# Patient Record
Sex: MALE | Race: OTHER | Employment: OTHER | URBAN - METROPOLITAN AREA
[De-identification: names, ages, dates, MRNs, and addresses within clinical notes are randomized per-mention and may not be internally consistent; named-entity substitution may affect disease eponyms.]

---

## 2024-01-31 ENCOUNTER — OFFICE VISIT (OUTPATIENT)
Dept: OTOLARYNGOLOGY | Facility: CLINIC | Age: 49
End: 2024-01-31
Payer: COMMERCIAL

## 2024-01-31 VITALS
BODY MASS INDEX: 28.35 KG/M2 | SYSTOLIC BLOOD PRESSURE: 122 MMHG | TEMPERATURE: 97.3 F | DIASTOLIC BLOOD PRESSURE: 86 MMHG | WEIGHT: 191.4 LBS | HEIGHT: 69 IN

## 2024-01-31 DIAGNOSIS — J33.0 POLYP OF BOTH NASAL CAVITIES: ICD-10-CM

## 2024-01-31 DIAGNOSIS — J33.9 MULTIPLE NASAL POLYPS: ICD-10-CM

## 2024-01-31 DIAGNOSIS — J32.2 CHRONIC ETHMOIDAL SINUSITIS: ICD-10-CM

## 2024-01-31 DIAGNOSIS — J34.3 HYPERTROPHY OF BOTH INFERIOR NASAL TURBINATES: ICD-10-CM

## 2024-01-31 DIAGNOSIS — J32.0 CHRONIC MAXILLARY SINUSITIS: Primary | ICD-10-CM

## 2024-01-31 PROCEDURE — 99204 OFFICE O/P NEW MOD 45 MIN: CPT | Performed by: OTOLARYNGOLOGY

## 2024-01-31 PROCEDURE — 31231 NASAL ENDOSCOPY DX: CPT | Performed by: OTOLARYNGOLOGY

## 2024-01-31 PROCEDURE — 1036F TOBACCO NON-USER: CPT | Performed by: OTOLARYNGOLOGY

## 2024-01-31 RX ORDER — ALBUTEROL SULFATE 90 UG/1
2 AEROSOL, METERED RESPIRATORY (INHALATION) EVERY 4 HOURS PRN
COMMUNITY
Start: 2019-12-07

## 2024-01-31 RX ORDER — FLECAINIDE ACETATE 50 MG/1
50 TABLET ORAL 2 TIMES DAILY
COMMUNITY
Start: 2023-09-21

## 2024-01-31 RX ORDER — FLUTICASONE PROPIONATE 50 MCG
2 SPRAY, SUSPENSION (ML) NASAL DAILY
Qty: 48 ML | Refills: 3 | Status: SHIPPED | OUTPATIENT
Start: 2024-01-31 | End: 2024-04-30

## 2024-01-31 RX ORDER — METOPROLOL SUCCINATE 50 MG/1
50 TABLET, EXTENDED RELEASE ORAL 2 TIMES DAILY
COMMUNITY

## 2024-02-01 DIAGNOSIS — J33.9 MULTIPLE NASAL POLYPS: Primary | ICD-10-CM

## 2024-02-01 RX ORDER — PREDNISONE 10 MG/1
TABLET ORAL
Qty: 51 TABLET | Refills: 0 | Status: SHIPPED | OUTPATIENT
Start: 2024-02-01 | End: 2024-02-29 | Stop reason: ALTCHOICE

## 2024-02-15 ENCOUNTER — HOSPITAL ENCOUNTER (OUTPATIENT)
Dept: RADIOLOGY | Facility: CLINIC | Age: 49
Discharge: HOME | End: 2024-02-15
Payer: COMMERCIAL

## 2024-02-15 DIAGNOSIS — J32.2 CHRONIC ETHMOIDAL SINUSITIS: ICD-10-CM

## 2024-02-15 DIAGNOSIS — J32.0 CHRONIC MAXILLARY SINUSITIS: ICD-10-CM

## 2024-02-15 DIAGNOSIS — J33.0 POLYP OF BOTH NASAL CAVITIES: ICD-10-CM

## 2024-02-15 PROCEDURE — 70486 CT MAXILLOFACIAL W/O DYE: CPT

## 2024-02-29 ENCOUNTER — OFFICE VISIT (OUTPATIENT)
Dept: OTOLARYNGOLOGY | Facility: CLINIC | Age: 49
End: 2024-02-29
Payer: COMMERCIAL

## 2024-02-29 VITALS
BODY MASS INDEX: 28.82 KG/M2 | DIASTOLIC BLOOD PRESSURE: 88 MMHG | WEIGHT: 194.6 LBS | TEMPERATURE: 97.4 F | HEIGHT: 69 IN | SYSTOLIC BLOOD PRESSURE: 125 MMHG

## 2024-02-29 DIAGNOSIS — J32.2 CHRONIC ETHMOIDAL SINUSITIS: ICD-10-CM

## 2024-02-29 DIAGNOSIS — J33.9 MULTIPLE NASAL POLYPS: ICD-10-CM

## 2024-02-29 DIAGNOSIS — J32.0 CHRONIC MAXILLARY SINUSITIS: Primary | ICD-10-CM

## 2024-02-29 DIAGNOSIS — J34.3 HYPERTROPHY OF BOTH INFERIOR NASAL TURBINATES: ICD-10-CM

## 2024-02-29 PROBLEM — I47.10 SVT (SUPRAVENTRICULAR TACHYCARDIA) (CMS-HCC): Status: ACTIVE | Noted: 2017-07-07

## 2024-02-29 PROCEDURE — 1036F TOBACCO NON-USER: CPT | Performed by: OTOLARYNGOLOGY

## 2024-02-29 PROCEDURE — 99214 OFFICE O/P EST MOD 30 MIN: CPT | Performed by: OTOLARYNGOLOGY

## 2024-02-29 NOTE — PROGRESS NOTES
Impression:  1. Chronic maxillary sinusitis        2. Chronic ethmoidal sinusitis        3. Multiple nasal polyps        4. Hypertrophy of both inferior nasal turbinates              RECOMMENDATIONS/PLAN :  The various risks and benefits, complications and alternatives and expected course of recovery were explained to the patient and he would like to proceed with endoscopic sinus surgery using the navigation system to open up his anterior and posterior ethmoid sinuses, bilateral maxillary sinuses, endoscopic removal of bilateral nasal polyps, bilateral inferior turbinate Coblation/resection.  We will obtain preadmission testing due to his previous history of supraventricular tachycardia and if anesthesia does not feel comfortable performing this procedure at an outpatient surgery center, I will have to refer him to one of my rhinology colleagues who has access to a hospital setting.      **This electronic medical record note was created with the use of voice recognition software.  Despite proofreading, typographical or grammatical errors may be present that could affect meaning of content **    Subjective   Patient ID:     Pepe Whatley is a 48 y.o. male who presents to the office today as a checkup on his sinuses.  He finished all of his oral prednisone and he is breathing a little better.  He has been using his Flonase nasal spray as directed.  No recent fever chills or pus draining from the sinuses.  No significant facial pain however he still has significant congestion.  I was able to review his sinus CT films with him today.    ROS:  A detailed 12 system review of systems is noted on the intake form has been reviewed with the patient with details noted in the HPI and scanned into the patient's medical record.    Objective     PMH: Chronic sinusitis, history of nasal polyps, history of supraventricular tachycardia, history of asthma    History reviewed. No pertinent surgical history.     No Known Allergies  "      Current Outpatient Medications:     flecainide (Tambocor) 50 mg tablet, Take 1 tablet (50 mg) by mouth twice a day., Disp: , Rfl:     fluticasone (Flonase) 50 mcg/actuation nasal spray, Administer 2 sprays into each nostril once daily., Disp: 48 mL, Rfl: 3    metoprolol succinate XL (Toprol-XL) 50 mg 24 hr tablet, Take 1 tablet (50 mg) by mouth 2 times a day., Disp: , Rfl:     albuterol 90 mcg/actuation inhaler, Inhale 2 puffs every 4 hours if needed., Disp: , Rfl:      Tobacco Use: Low Risk  (2/29/2024)    Patient History     Smoking Tobacco Use: Never     Smokeless Tobacco Use: Never     Passive Exposure: Not on file        Alcohol Use: Not on file        Social History     Substance and Sexual Activity   Drug Use Not on file        Physical Exam:  Visit Vitals  /88   Temp 36.3 °C (97.4 °F) (Temporal)   Ht 1.753 m (5' 9\")   Wt 88.3 kg (194 lb 9.6 oz)   BMI 28.74 kg/m²   Smoking Status Never   BSA 2.07 m²      General: Patient is alert, oriented, cooperative in no apparent distress.  Head: Normocephalic, atraumatic.  Eyes: PERRL, EOMI, Conjunctiva is clear. No nystagmus.  Ears: Right Ear-- Pinna is normal.  External auditory canal is patent. Tympanic membrane is [intact, translucent and has good mobility with my pneumatic otoscope. No effusion].  Mastoid is nontender.  Left ear-- Pinna is normal.  External auditory canal is patent. Tympanic membrane is [intact, translucent and has good mobility with my pneumatic otoscope.  No effusion].  Mastoid is nontender.  Nose: Septum is relatively straight with a mild deviation to the right.  No septal perforation or lesions. No septal hematoma/ seroma.  No signs of bleeding.  Inferior turbinates are moderately swollen and obstructive..  Patient has extensive bilateral nasal polyps no infectious drainage.  Throat:  Floor of mouth is clear, no masses.  Tongue appears normal, no lesions or masses. Gums, gingiva, buccal mucosa appear pink and moist, no lesions. Teeth " are in good repair.  No obvious dental infections.  Peritonsillar regions appear symmetric without swelling.  Hard and soft palate appear normal, no obvious cleft. Uvula is midline.  Oropharynx: No lesions. Retropharyngeal wall is flat.  No active postnasal drip.  Neck: Supple,  no lymphadenopathy.  No masses.  Salivary Glands: Symmetric bilaterally.  No palpable masses.  No evidence of acute infection or salivary stones  Neurologic: Cranial Nerves 2-12 are grossly intact without focal deficits. Cerebellar function testing is normal.   Cardiovascular: Heart regular rate and rhythm  Lungs: Clear to auscultation bilaterally  Abdomen: Soft nontender bowel sounds present x 4  Extremities: No clubbing cyanosis or edema  Results:   I personally reviewed his recent sinus CT results and he has extensive bilateral anterior and posterior ethmoid disease with bilateral nasal polyps.  He also has chronic thickening along his bilateral maxillary sinuses.  He has bilateral inferior turbinate hypertrophy.    Procedure:   []    Paul Ureña, DO

## 2024-03-25 PROBLEM — H52.209 ASTIGMATISM: Status: ACTIVE | Noted: 2024-03-25

## 2024-03-25 PROBLEM — R09.89 PULMONARY CONGESTION: Status: ACTIVE | Noted: 2024-03-25

## 2024-03-25 PROBLEM — J45.901 ACUTE ASTHMA EXACERBATION (HHS-HCC): Status: ACTIVE | Noted: 2023-02-12

## 2024-03-25 PROBLEM — R10.13 ABDOMINAL PAIN, EPIGASTRIC: Status: ACTIVE | Noted: 2018-02-26

## 2024-03-25 PROBLEM — J32.9 CHRONIC SINUSITIS: Status: ACTIVE | Noted: 2024-02-15

## 2024-03-25 PROBLEM — R42 DIZZINESS: Status: ACTIVE | Noted: 2024-03-25

## 2024-03-25 PROBLEM — J40 BRONCHITIS, NOT SPECIFIED AS ACUTE OR CHRONIC: Status: ACTIVE | Noted: 2023-02-12

## 2024-03-25 PROBLEM — J33.0: Status: ACTIVE | Noted: 2024-02-15

## 2024-03-25 PROBLEM — I49.9 CARDIAC ARRHYTHMIA: Status: ACTIVE | Noted: 2018-02-26

## 2024-03-25 PROBLEM — R00.2 PALPITATIONS: Status: ACTIVE | Noted: 2023-02-12

## 2024-03-25 PROBLEM — J32.9 SINOBRONCHITIS: Status: ACTIVE | Noted: 2024-03-25

## 2024-03-25 PROBLEM — R07.89 ATYPICAL CHEST PAIN: Status: ACTIVE | Noted: 2023-02-12

## 2024-03-25 PROBLEM — R19.7 DIARRHEA, UNSPECIFIED: Status: ACTIVE | Noted: 2024-03-25

## 2024-03-25 PROBLEM — J40 SINOBRONCHITIS: Status: ACTIVE | Noted: 2024-03-25

## 2024-03-25 PROBLEM — J34.3 HYPERTROPHY OF NASAL TURBINATES: Status: ACTIVE | Noted: 2024-03-25

## 2024-03-25 PROBLEM — R09.89 CHEST CONGESTION: Status: ACTIVE | Noted: 2024-03-25

## 2024-03-25 PROBLEM — R05.9 COUGH, UNSPECIFIED: Status: ACTIVE | Noted: 2023-02-12

## 2024-03-25 PROBLEM — J33.9 NASAL POLYPS: Status: ACTIVE | Noted: 2024-03-25

## 2024-03-25 RX ORDER — CALCIPOTRIENE 50 UG/G
OINTMENT TOPICAL
COMMUNITY
Start: 2023-11-03 | End: 2024-03-29 | Stop reason: ALTCHOICE

## 2024-03-25 RX ORDER — MELOXICAM 15 MG/1
1 TABLET ORAL DAILY
COMMUNITY
Start: 2023-11-02

## 2024-03-25 RX ORDER — HYDROGEN PEROXIDE 3 %
SOLUTION, NON-ORAL MISCELLANEOUS
COMMUNITY

## 2024-03-25 RX ORDER — CETIRIZINE HYDROCHLORIDE 10 MG/1
TABLET ORAL
COMMUNITY
Start: 2023-07-17

## 2024-03-28 ENCOUNTER — OFFICE VISIT (OUTPATIENT)
Dept: OTOLARYNGOLOGY | Facility: CLINIC | Age: 49
End: 2024-03-28
Payer: COMMERCIAL

## 2024-03-28 VITALS
TEMPERATURE: 98.1 F | HEIGHT: 66 IN | DIASTOLIC BLOOD PRESSURE: 81 MMHG | WEIGHT: 189 LBS | RESPIRATION RATE: 16 BRPM | BODY MASS INDEX: 30.37 KG/M2 | OXYGEN SATURATION: 97 % | HEART RATE: 70 BPM | SYSTOLIC BLOOD PRESSURE: 116 MMHG

## 2024-03-28 DIAGNOSIS — J34.89 NASAL OBSTRUCTION: ICD-10-CM

## 2024-03-28 DIAGNOSIS — J32.4 CHRONIC PANSINUSITIS: Primary | ICD-10-CM

## 2024-03-28 DIAGNOSIS — J34.3 HYPERTROPHY OF NASAL TURBINATES: ICD-10-CM

## 2024-03-28 DIAGNOSIS — J33.9 NOSE POLYP: ICD-10-CM

## 2024-03-28 DIAGNOSIS — J31.0 CHRONIC RHINITIS: ICD-10-CM

## 2024-03-28 DIAGNOSIS — J34.2 DEVIATED NASAL SEPTUM: ICD-10-CM

## 2024-03-28 PROCEDURE — 31231 NASAL ENDOSCOPY DX: CPT | Performed by: STUDENT IN AN ORGANIZED HEALTH CARE EDUCATION/TRAINING PROGRAM

## 2024-03-28 PROCEDURE — 99214 OFFICE O/P EST MOD 30 MIN: CPT | Performed by: STUDENT IN AN ORGANIZED HEALTH CARE EDUCATION/TRAINING PROGRAM

## 2024-03-28 RX ORDER — CEFAZOLIN SODIUM 2 G/100ML
2 INJECTION, SOLUTION INTRAVENOUS ONCE
OUTPATIENT
Start: 2024-03-28 | End: 2024-03-28

## 2024-03-28 RX ORDER — SODIUM CHLORIDE, SODIUM LACTATE, POTASSIUM CHLORIDE, CALCIUM CHLORIDE 600; 310; 30; 20 MG/100ML; MG/100ML; MG/100ML; MG/100ML
100 INJECTION, SOLUTION INTRAVENOUS CONTINUOUS
OUTPATIENT
Start: 2024-03-28

## 2024-03-28 SDOH — ECONOMIC STABILITY: FOOD INSECURITY: WITHIN THE PAST 12 MONTHS, THE FOOD YOU BOUGHT JUST DIDN'T LAST AND YOU DIDN'T HAVE MONEY TO GET MORE.: NEVER TRUE

## 2024-03-28 SDOH — ECONOMIC STABILITY: FOOD INSECURITY: WITHIN THE PAST 12 MONTHS, YOU WORRIED THAT YOUR FOOD WOULD RUN OUT BEFORE YOU GOT MONEY TO BUY MORE.: NEVER TRUE

## 2024-03-28 ASSESSMENT — ENCOUNTER SYMPTOMS
DEPRESSION: 0
LOSS OF SENSATION IN FEET: 0
OCCASIONAL FEELINGS OF UNSTEADINESS: 0

## 2024-03-28 ASSESSMENT — PATIENT HEALTH QUESTIONNAIRE - PHQ9
2. FEELING DOWN, DEPRESSED OR HOPELESS: NOT AT ALL
SUM OF ALL RESPONSES TO PHQ9 QUESTIONS 1 AND 2: 0
1. LITTLE INTEREST OR PLEASURE IN DOING THINGS: NOT AT ALL

## 2024-03-28 ASSESSMENT — COLUMBIA-SUICIDE SEVERITY RATING SCALE - C-SSRS
1. IN THE PAST MONTH, HAVE YOU WISHED YOU WERE DEAD OR WISHED YOU COULD GO TO SLEEP AND NOT WAKE UP?: NO
2. HAVE YOU ACTUALLY HAD ANY THOUGHTS OF KILLING YOURSELF?: NO
6. HAVE YOU EVER DONE ANYTHING, STARTED TO DO ANYTHING, OR PREPARED TO DO ANYTHING TO END YOUR LIFE?: NO

## 2024-03-28 ASSESSMENT — PAIN SCALES - GENERAL: PAINLEVEL: 0-NO PAIN

## 2024-03-28 NOTE — LETTER
"March 29, 2024     Paul Ureña DO  [address] [place] [address]    Patient: Pepe Whatley   YOB: 1975   Date of Visit: 3/28/2024       Dear Dr. Paul Ureña DO:    Thank you for referring Pepe Whatley to me for evaluation. Below are my notes for this consultation.  If you have questions, please do not hesitate to call me. I look forward to following your patient along with you.       Sincerely,     Mars Glass MD      CC: Milton Aaron DO  ______________________________________________________________________________________    SUBJECTIVE  Patient ID: Pepe Whatley is a 48 y.o. male who presents for New Patient Visit (Sinus issues /Blockage ).    Referred by my partner for surgery; needs hospital setting for surgery due to SVT.    The patient reports prior surgery for his sinuses some 12 years ago; performed in [facility]. He notes constant nasal congestion/obstruction. Sense of smell is \"okay.\" They deny nasal drainage, facial pressure, facial pain, and epistaxis. They deny a history of environmental allergies; no formal allergy testing.    Previously tried steroid spray (Flonase) and oral steroids .     Review of Systems  Complete ROS negative except as noted above or on patient intake form and as above.    OBJECTIVE  Physical Exam  CONSTITUTIONAL: Well appearing male who appears stated age.  PSYCHIATRIC: Alert, appropriate mood and affect.  RESPIRATORY: Normal inspiration and expiration and chest wall expansion; no use of accessory muscles to breathe.  VOICE: Clear speech without hoarseness. No stridor nor stertor.  HEAD, FACE, AND SKIN: Symmetric facial feature. No cutaneous masses or lesions were visualized. The parotid and submandibular glands were normal to palpation.  EYES: Pupils were equal in size and reactive to light. Extra-ocular muscle function was intact. No nystagmus was observed. Vision was grossly intact.  EARS: External ears were normally formed with " no lesions. The external auditory canals were clear. The tympanic membranes were intact and in the neutral position. No significant retraction pockets nor effusions were appreciated.  NOSE: Nasal dorsum was midline. Anterior rhinoscopy demonstrated a septum deviation. Inferior turbinates were severely hypertrophied. Nasal polyposis noted at the middle meatus bilaterally.  ORAL CAVITY: Lips were without lesions. Moist mucous membranes. No lesions appreciated along the gingiva, oral mucosa, nor tongue.  DENTITION: Grossly normal without obvious infection nor inflammation.  OROPHARYNX: No lesion nor mucosal abnormality. The uvula was normal appearing. No drainage.  NECK: Visualization and palpation of the neck revealed no mass lesions, no thyromegaly or thyroid masses. No cutaneous lesions appreciated.  LYMPHATICS (CERVICAL): There were no palpable lymph nodes in the posterior triangle, submandibular triangle, jugulodigastric region, nor central neck.  NEUROLOGIC: Cranial nerves III, IV, and VI were noted to be intact via extra-ocular muscle movement testing. Cranial nerve V was noted to be intact to soft touch bilaterally. Cranial nerve VII was noted to be intact and symmetric by facial movement. Cranial nerve VIII was tested with normal voice examination and revealed grossly normal hearing. Cranial nerves IX and X noted to be intact by palatal movement. Cranial nerve XI noted to be intact via shoulder shrug.  Cranial nerve XII noted to be intact with active and symmetric tongue movement.     Radiology  CT sinus 2/15/2024: I personally reviewed with the patient his recent imaging.  This demonstrates varying levels of pansinusitis with thick mucosal thickening at the middle meatus and ostiomeatal complex consistent with no nasal polyposis.  Patient's imaging suggest likely prior septoplasty although there is some residual right superior septal deviation.  Inferior turbinates are quite  hypertrophied.      --------------------------------------------------  Procedure: Nasal endoscopy - Diagnostic  Indication: Chronic rhinitis, CRS, nasal polyposis  Informed consent verbally obtained: The risks, benefits, alternatives, and expectations were discussed with the patient, who wished to proceed  Anesthesia: declined (fasting)    Findings: After topical anesthetic was applied the nasal cavities were examined with a flexible endoscope. The septum was slightly deviated to the right superiorly.  - Right: The inferior turbinate was moderately hypertrophied. Thick nasal polyps could be appreciated tracking along the middle meatus to the sphenoethmoid recess.  The nasal passageway was narrowed secondary to edema.  - Left: The inferior turbinate was severely hypertrophied. Thick nasal polyps could be appreciated tracking along the middle meatus to the sphenoethmoid recess.  The nasal passageway was narrowed secondary to edema.    The nasopharynx was poorly visualized but appeared clear.    There were no complications and the patient tolerated the procedure well.  --------------------------------------------------    ASSESSMENT/PLAN  Diagnoses and all orders for this visit:  Chronic pansinusitis  -     Case Request Operating Room: Endoscopic sinus surgery with image guidance, inferior turbinate reductions, possible septoplasty; Standing  -     Request for Pre-Admission Testing Visit; Future  -     Place in outpatient/hospital ambulatory surgery; Standing  -     Full code; Standing  -     Coagulation Screen; Future  -     CBC; Future  -     Basic Metabolic Panel; Future  -     Vital Signs; Standing  -     Pulse oximetry, spot; Standing  -     Insert and maintain peripheral IV; Standing  -     Saline lock IV; Standing  -     lactated Ringer's infusion  -     ceFAZolin in dextrose (iso-os) (Ancef) IVPB 2 g  Nose polyp  -     Case Request Operating Room: Endoscopic sinus surgery with image guidance, inferior  turbinate reductions, possible septoplasty; Standing  -     Request for Pre-Admission Testing Visit; Future  -     Place in outpatient/hospital ambulatory surgery; Standing  -     Full code; Standing  -     Coagulation Screen; Future  -     CBC; Future  -     Basic Metabolic Panel; Future  -     Vital Signs; Standing  -     Pulse oximetry, spot; Standing  -     Insert and maintain peripheral IV; Standing  -     Saline lock IV; Standing  -     lactated Ringer's infusion  -     ceFAZolin in dextrose (iso-os) (Ancef) IVPB 2 g  Nasal obstruction  -     Case Request Operating Room: Endoscopic sinus surgery with image guidance, inferior turbinate reductions, possible septoplasty; Standing  Deviated nasal septum  -     Case Request Operating Room: Endoscopic sinus surgery with image guidance, inferior turbinate reductions, possible septoplasty; Standing  Hypertrophy of nasal turbinates  -     Case Request Operating Room: Endoscopic sinus surgery with image guidance, inferior turbinate reductions, possible septoplasty; Standing  Chronic rhinitis      48 y.o. male presenting with longstanding history of chronic sinusitis with nasal polyposis.    1.  Chronic sinusitis, nasal polyposis, nasal obstruction, NSD/ITH  The patient has longstanding history of chronic sinusitis.  He reports prior surgery some decade ago.  On nasal endoscopy the patient has fidelia nasal polyps obstructing the middle meatus bilaterally.  Imaging demonstrates varying levels of pansinusitis.  The patient was referred for consideration of surgery in the hospital setting.    The patient and I discussed their symptoms and clinical findings noted above in detail. We discussed options including observation, continued medical therapy, or consideration of surgical intervention. Endoscopic sinus surgery itself was discussed at length. The risks, benefits, complications, and alternatives were explained in detail including but not limited to persistence of  symptoms, anesthesia, pain, changes in or loss of smell, nasal obstruction, septal perforation, bleeding, infection, need for nasal packing, double vision, vision loss, CSF leak requiring other procedures to repair, numbness of teeth/and or face, need for revision surgery, injury to surrounding tissues, and unexpected risks.    The patient expressed understanding of these risks and wishes to proceed. Informed consent was signed. An information sheet via the medical record was provided to the patient which included the rationale for surgery, risks, and expected postoperative care.    Planned procedure: bilateral pansinus endoscopic sinus surgery with image guidance, inferior turbinate reductions, possible revision septoplasty    We will obtain cardiac clearance from his cardiologist.    This note was created using speech recognition transcription software. Despite proofreading, typographical errors may be present that affect the meaning of the content. Please contact my office with any questions.

## 2024-03-28 NOTE — PATIENT INSTRUCTIONS
Congratulations on having surgery! The following instructions will help you know what to expect in the days following your surgery. Do not hesitate to call if you have questions or concerns.    After surgery, please have the following items available at home:  - NeilMed Sinus Rinse® bottle for post-surgical nasal irrigations  - Oxymetazoline nasal spray (Afrin®) - to be used ONLY if you have a nosebleed  - 4x4 gauze and paper tape    Activities  - You may shower this evening.  - Avoid sneezing or blowing your nose for 2 weeks after surgery (gently sniff rather than blow. If you do sneeze, do so with your mouth open).  - Activities should be limited for 1 week after surgery.  - Do not lift heavy objects (greater than 10 pounds) for 1 week after surgery.  - Walking is strongly encouraged.  - Most patients do not return to work for about 5 to 7 days after surgery; however some return sooner, and others may need more time. The level of pain and frustration caused by the nasal congestion and blockage may be different from patient to patient.    Diet  Stick to lighter food for the 24 hours after surgery. Then you can resume your regular diet.    Pain Control  - Many patients do not have much pain after nasal surgery, but everyone is different.  - Most patients feel pressure and congestion.  - For mild pain, acetaminophen (Tylenol®) should work well.  - For stronger pain you may be prescribed a narcotic medication. Drink plenty of water as these stronger pain medications can be constipating. Also take the prescribed stool softeners on a regular basis until your bowel movements have returned to normal.  - Avoid taking aspirin, ibuprofen (Advil®, Motrin®), naproxen (Aleve®, Naprosyn®), and other nonsteroidal anti-inflammatory medications (NSAIDs) for a week following nasal surgery. These drugs can increase bleeding.  - Pain should lessen with time. If pain increases, call the office.    Drainage  You can expect to have  bloody nasal drainage after nasal or sinus surgery. To catch this drainage and to avoid continuous nose wiping we recommend using a gauze “mustache” dressing under the nose. Tape this to the cheeks for as long as the drainage continues.    NASAL SALINE IRRIGATION: THIS IS VERY IMPORTANT  - After sinus surgery, we like to have the nose irrigated with a NeilMed Sinus Rinse® bottle and a special saline solution (irrigation instructions and saline solution recipe are below). This will help rinse the blood clots and mucous from the nose.  - START IRRIGATIONS THE DAY AFTER SURGERY.  - Never use your fingers to clear blockages in your nose.    How to Irrigate:  - Fill the NeilMed Sinus Rinse® bottle with the room temperature saline solution (see below for recipe). Gently insert the tip into one nostril and squeeze. Keep your head down to prevent water from going back down into your throat. Use about one half of the bottle per nostril. Do this for each nostril 3-4 times daily for the first two weeks after surgery.    Nasal Irrigation Solution Recipe:  8 ounces of room temperature distilled water. (If you use tap water, boil it first and let it cool to room temperature.)  ½ teaspoon of table salt  ½ teaspoon of baking soda  You can use the NeilMed Sinus Rinse® solution packets if preferred.    BLEEDING  Some blood in your nasal drainage after surgery is expected. This may be dark red or brown. If the nose is bleeding freely or you think the amount is too much, call the office immediately.  Avoid nose blowing and try to sneeze with your mouth open if needed.  Sleeping with your head elevated will also help prevent bleeding and reduce congestion.  If you have a nosebleed, you can try spraying oxymetazoline spray (Afrin®) in the nose.    ANTIBIOTICS  You may be given antibiotics, which you should begin taking the day after surgery. If you were not prescribed antibiotics disregard this info.    STEROIDS  You may be given  steroids, which you should begin taking the day after surgery. If you were not prescribed steroids disregard this info.    Miscellaneous  A low-grade fever, less than 101° F is not uncommon for 2 to 3 days after surgery. If fever continues or is higher than 101° F, call your physician. Use acetaminophen to control the fever.    Follow-up  Your appointment for follow up after your surgery should have been scheduled at the time of your surgery. If not, please call the office for an appointment scheduled for 1-2 weeks after the date of your surgery.    Call the office or GO TO THE EMERGENCY ROOM if you have:  - Excessive pain, swelling, bleeding or drainage  - Shortness of breath or difficulty breathing  - Persistent nausea and vomiting  - Fever that continues or is higher than 101° F  - Neck stiffness (cannot touch your chin to your chest)  - Confusion  - Worsening headaches  - Clear drainage dripping from your nose like a leaky faucet (note that this may happen and is normal up to 30 minutes following saline sprays)  - Salty or metallic taste (note that you may experience a salty taste which is normal up to 30 minutes following saline sprays and irrigations)    Do not hesitate to call if you have any questions or concerns:  - Please call Dr. ELOINA Glass’s office at [phone] during normal business hours.  - After hours the office number will direct you to an answering service that will contact Dr. Glass.  - If Dr. Glass is not available for emergency questions you can call [facility] at [phone] and request to speak to the “ENT resident physician on call.”

## 2024-03-28 NOTE — PROGRESS NOTES
"SUBJECTIVE  Patient ID: Pepe Whatley is a 48 y.o. male who presents for New Patient Visit (Sinus issues /Blockage ).    Referred by my partner for surgery; needs hospital setting for surgery due to SVT.    The patient reports prior surgery for his sinuses some 12 years ago; performed in [facility]. He notes constant nasal congestion/obstruction. Sense of smell is \"okay.\" They deny nasal drainage, facial pressure, facial pain, and epistaxis. They deny a history of environmental allergies; no formal allergy testing.    Previously tried steroid spray (Flonase) and oral steroids .     Review of Systems  Complete ROS negative except as noted above or on patient intake form and as above.    OBJECTIVE  Physical Exam  CONSTITUTIONAL: Well appearing male who appears stated age.  PSYCHIATRIC: Alert, appropriate mood and affect.  RESPIRATORY: Normal inspiration and expiration and chest wall expansion; no use of accessory muscles to breathe.  VOICE: Clear speech without hoarseness. No stridor nor stertor.  HEAD, FACE, AND SKIN: Symmetric facial feature. No cutaneous masses or lesions were visualized. The parotid and submandibular glands were normal to palpation.  EYES: Pupils were equal in size and reactive to light. Extra-ocular muscle function was intact. No nystagmus was observed. Vision was grossly intact.  EARS: External ears were normally formed with no lesions. The external auditory canals were clear. The tympanic membranes were intact and in the neutral position. No significant retraction pockets nor effusions were appreciated.  NOSE: Nasal dorsum was midline. Anterior rhinoscopy demonstrated a septum deviation. Inferior turbinates were severely hypertrophied. Nasal polyposis noted at the middle meatus bilaterally.  ORAL CAVITY: Lips were without lesions. Moist mucous membranes. No lesions appreciated along the gingiva, oral mucosa, nor tongue.  DENTITION: Grossly normal without obvious infection nor " inflammation.  OROPHARYNX: No lesion nor mucosal abnormality. The uvula was normal appearing. No drainage.  NECK: Visualization and palpation of the neck revealed no mass lesions, no thyromegaly or thyroid masses. No cutaneous lesions appreciated.  LYMPHATICS (CERVICAL): There were no palpable lymph nodes in the posterior triangle, submandibular triangle, jugulodigastric region, nor central neck.  NEUROLOGIC: Cranial nerves III, IV, and VI were noted to be intact via extra-ocular muscle movement testing. Cranial nerve V was noted to be intact to soft touch bilaterally. Cranial nerve VII was noted to be intact and symmetric by facial movement. Cranial nerve VIII was tested with normal voice examination and revealed grossly normal hearing. Cranial nerves IX and X noted to be intact by palatal movement. Cranial nerve XI noted to be intact via shoulder shrug.  Cranial nerve XII noted to be intact with active and symmetric tongue movement.     Radiology  CT sinus 2/15/2024: I personally reviewed with the patient his recent imaging.  This demonstrates varying levels of pansinusitis with thick mucosal thickening at the middle meatus and ostiomeatal complex consistent with no nasal polyposis.  Patient's imaging suggest likely prior septoplasty although there is some residual right superior septal deviation.  Inferior turbinates are quite hypertrophied.      --------------------------------------------------  Procedure: Nasal endoscopy - Diagnostic  Indication: Chronic rhinitis, CRS, nasal polyposis  Informed consent verbally obtained: The risks, benefits, alternatives, and expectations were discussed with the patient, who wished to proceed  Anesthesia: declined (fasting)    Findings: After topical anesthetic was applied the nasal cavities were examined with a flexible endoscope. The septum was slightly deviated to the right superiorly.  - Right: The inferior turbinate was moderately hypertrophied. Thick nasal polyps could  be appreciated tracking along the middle meatus to the sphenoethmoid recess.  The nasal passageway was narrowed secondary to edema.  - Left: The inferior turbinate was severely hypertrophied. Thick nasal polyps could be appreciated tracking along the middle meatus to the sphenoethmoid recess.  The nasal passageway was narrowed secondary to edema.    The nasopharynx was poorly visualized but appeared clear.    There were no complications and the patient tolerated the procedure well.  --------------------------------------------------    ASSESSMENT/PLAN  Diagnoses and all orders for this visit:  Chronic pansinusitis  -     Case Request Operating Room: Endoscopic sinus surgery with image guidance, inferior turbinate reductions, possible septoplasty; Standing  -     Request for Pre-Admission Testing Visit; Future  -     Place in outpatient/hospital ambulatory surgery; Standing  -     Full code; Standing  -     Coagulation Screen; Future  -     CBC; Future  -     Basic Metabolic Panel; Future  -     Vital Signs; Standing  -     Pulse oximetry, spot; Standing  -     Insert and maintain peripheral IV; Standing  -     Saline lock IV; Standing  -     lactated Ringer's infusion  -     ceFAZolin in dextrose (iso-os) (Ancef) IVPB 2 g  Nose polyp  -     Case Request Operating Room: Endoscopic sinus surgery with image guidance, inferior turbinate reductions, possible septoplasty; Standing  -     Request for Pre-Admission Testing Visit; Future  -     Place in outpatient/hospital ambulatory surgery; Standing  -     Full code; Standing  -     Coagulation Screen; Future  -     CBC; Future  -     Basic Metabolic Panel; Future  -     Vital Signs; Standing  -     Pulse oximetry, spot; Standing  -     Insert and maintain peripheral IV; Standing  -     Saline lock IV; Standing  -     lactated Ringer's infusion  -     ceFAZolin in dextrose (iso-os) (Ancef) IVPB 2 g  Nasal obstruction  -     Case Request Operating Room: Endoscopic sinus  surgery with image guidance, inferior turbinate reductions, possible septoplasty; Standing  Deviated nasal septum  -     Case Request Operating Room: Endoscopic sinus surgery with image guidance, inferior turbinate reductions, possible septoplasty; Standing  Hypertrophy of nasal turbinates  -     Case Request Operating Room: Endoscopic sinus surgery with image guidance, inferior turbinate reductions, possible septoplasty; Standing  Chronic rhinitis      48 y.o. male presenting with longstanding history of chronic sinusitis with nasal polyposis.    1.  Chronic sinusitis, nasal polyposis, nasal obstruction, NSD/ITH  The patient has longstanding history of chronic sinusitis.  He reports prior surgery some decade ago.  On nasal endoscopy the patient has fidelia nasal polyps obstructing the middle meatus bilaterally.  Imaging demonstrates varying levels of pansinusitis.  The patient was referred for consideration of surgery in the hospital setting.    The patient and I discussed their symptoms and clinical findings noted above in detail. We discussed options including observation, continued medical therapy, or consideration of surgical intervention. Endoscopic sinus surgery itself was discussed at length. The risks, benefits, complications, and alternatives were explained in detail including but not limited to persistence of symptoms, anesthesia, pain, changes in or loss of smell, nasal obstruction, septal perforation, bleeding, infection, need for nasal packing, double vision, vision loss, CSF leak requiring other procedures to repair, numbness of teeth/and or face, need for revision surgery, injury to surrounding tissues, and unexpected risks.    The patient expressed understanding of these risks and wishes to proceed. Informed consent was signed. An information sheet via the medical record was provided to the patient which included the rationale for surgery, risks, and expected postoperative care.    Planned procedure:  bilateral pansinus endoscopic sinus surgery with image guidance, inferior turbinate reductions, possible revision septoplasty    We will obtain cardiac clearance from his cardiologist.    This note was created using speech recognition transcription software. Despite proofreading, typographical errors may be present that affect the meaning of the content. Please contact my office with any questions.

## 2024-03-29 PROBLEM — J31.0 CHRONIC RHINITIS: Status: ACTIVE | Noted: 2024-03-29

## 2024-03-29 PROBLEM — J34.89 NASAL OBSTRUCTION: Status: ACTIVE | Noted: 2024-03-29

## 2024-04-08 PROBLEM — J33.9 NOSE POLYP: Status: ACTIVE | Noted: 2024-03-28

## 2024-04-08 PROBLEM — J34.2 DEVIATED NASAL SEPTUM: Status: ACTIVE | Noted: 2024-03-28

## 2024-06-14 ENCOUNTER — PRE-ADMISSION TESTING (OUTPATIENT)
Dept: PREADMISSION TESTING | Facility: HOSPITAL | Age: 49
End: 2024-06-14
Payer: COMMERCIAL

## 2024-06-14 VITALS
HEART RATE: 67 BPM | TEMPERATURE: 96.8 F | SYSTOLIC BLOOD PRESSURE: 116 MMHG | DIASTOLIC BLOOD PRESSURE: 79 MMHG | RESPIRATION RATE: 20 BRPM | OXYGEN SATURATION: 97 %

## 2024-06-14 DIAGNOSIS — J32.4 CHRONIC PANSINUSITIS: ICD-10-CM

## 2024-06-14 DIAGNOSIS — Z01.818 PREOP TESTING: Primary | ICD-10-CM

## 2024-06-14 DIAGNOSIS — J33.9 NOSE POLYP: ICD-10-CM

## 2024-06-14 LAB
ANION GAP SERPL CALC-SCNC: 9 MMOL/L (ref 10–20)
APTT PPP: 33 SECONDS (ref 27–38)
BUN SERPL-MCNC: 21 MG/DL (ref 6–23)
CALCIUM SERPL-MCNC: 9.6 MG/DL (ref 8.6–10.3)
CHLORIDE SERPL-SCNC: 106 MMOL/L (ref 98–107)
CO2 SERPL-SCNC: 29 MMOL/L (ref 21–32)
CREAT SERPL-MCNC: 0.93 MG/DL (ref 0.5–1.3)
EGFRCR SERPLBLD CKD-EPI 2021: >90 ML/MIN/1.73M*2
ERYTHROCYTE [DISTWIDTH] IN BLOOD BY AUTOMATED COUNT: 12.5 % (ref 11.5–14.5)
GLUCOSE SERPL-MCNC: 97 MG/DL (ref 74–99)
HCT VFR BLD AUTO: 47.7 % (ref 41–52)
HGB BLD-MCNC: 16.3 G/DL (ref 13.5–17.5)
INR PPP: 1 (ref 0.9–1.1)
MCH RBC QN AUTO: 30.1 PG (ref 26–34)
MCHC RBC AUTO-ENTMCNC: 34.2 G/DL (ref 32–36)
MCV RBC AUTO: 88 FL (ref 80–100)
NRBC BLD-RTO: 0 /100 WBCS (ref 0–0)
PLATELET # BLD AUTO: 280 X10*3/UL (ref 150–450)
POTASSIUM SERPL-SCNC: 4.3 MMOL/L (ref 3.5–5.3)
PROTHROMBIN TIME: 11.1 SECONDS (ref 9.8–12.8)
RBC # BLD AUTO: 5.42 X10*6/UL (ref 4.5–5.9)
SODIUM SERPL-SCNC: 140 MMOL/L (ref 136–145)
WBC # BLD AUTO: 5.7 X10*3/UL (ref 4.4–11.3)

## 2024-06-14 PROCEDURE — 99203 OFFICE O/P NEW LOW 30 MIN: CPT | Performed by: NURSE PRACTITIONER

## 2024-06-14 PROCEDURE — 85027 COMPLETE CBC AUTOMATED: CPT

## 2024-06-14 PROCEDURE — 80048 BASIC METABOLIC PNL TOTAL CA: CPT

## 2024-06-14 PROCEDURE — 93005 ELECTROCARDIOGRAM TRACING: CPT

## 2024-06-14 PROCEDURE — 36415 COLL VENOUS BLD VENIPUNCTURE: CPT

## 2024-06-14 PROCEDURE — 85610 PROTHROMBIN TIME: CPT

## 2024-06-14 ASSESSMENT — DUKE ACTIVITY SCORE INDEX (DASI)
CAN YOU DO YARD WORK LIKE RAKING LEAVES, WEEDING OR PUSHING A MOWER: YES
CAN YOU PARTICIPATE IN STRENOUS SPORTS LIKE SWIMMING, SINGLES TENNIS, FOOTBALL, BASKETBALL, OR SKIING: NO
CAN YOU DO HEAVY WORK AROUND THE HOUSE LIKE SCRUBBING FLOORS OR LIFTING AND MOVING HEAVY FURNITURE: YES
CAN YOU WALK A BLOCK OR TWO ON LEVEL GROUND: YES
DASI METS SCORE: 9
CAN YOU RUN A SHORT DISTANCE: YES
CAN YOU PARTICIPATE IN MODERATE RECREATIONAL ACTIVITIES LIKE GOLF, BOWLING, DANCING, DOUBLES TENNIS OR THROWING A BASEBALL OR FOOTBALL: YES
CAN YOU TAKE CARE OF YOURSELF (EAT, DRESS, BATHE, OR USE TOILET): YES
CAN YOU WALK INDOORS, SUCH AS AROUND YOUR HOUSE: YES
CAN YOU HAVE SEXUAL RELATIONS: YES
TOTAL_SCORE: 50.7
CAN YOU CLIMB A FLIGHT OF STAIRS OR WALK UP A HILL: YES
CAN YOU DO LIGHT WORK AROUND THE HOUSE LIKE DUSTING OR WASHING DISHES: YES
CAN YOU DO MODERATE WORK AROUND THE HOUSE LIKE VACUUMING, SWEEPING FLOORS OR CARRYING GROCERIES: YES

## 2024-06-14 ASSESSMENT — PAIN SCALES - GENERAL: PAINLEVEL_OUTOF10: 0 - NO PAIN

## 2024-06-14 ASSESSMENT — PAIN - FUNCTIONAL ASSESSMENT: PAIN_FUNCTIONAL_ASSESSMENT: 0-10

## 2024-06-14 NOTE — H&P (VIEW-ONLY)
"CPM/PAT Evaluation       Name: Pepe Whatley (Pepe Whatley)  /Age: 1975/48 y.o.     In-Person       Chief Complaint:     48 yr old male with c/o sinus issues    He denies allergies or nasal injuries   He endorses sinus issues since his 20's---> had nasal surgery in [facility], with improvement  States has been getting a cold---\"that goes to chest and treated by PCP or UC\"    He c/o nasal congestion bilaterally that fluctuates from right to left  He c/o PND with rare nasal drainage.   Endorses h/o sinus infection, with most recent incident last month.  States sense of smell sometimes ok    Denies difficulties with taste, swallowing or hearing.    He has tried nasal sprays, humidier, OTC medications.    Denies any cardiac or respiratory symptoms.  Denies any past issues with anesthesia.    ENDOSCOPIC SINUS SURGERY WITH IMAGE GUIDANCE/ INFERIOR TURBINATE REDUCTIONS/ POSSIBLE SEPTOPLASTY is Scheduled on 2024  with Dr. Glass     Vitals:    24 0745   BP: 116/79   Pulse: 67   Resp: 20   Temp: 36 °C (96.8 °F)   SpO2: 97%          Past Medical History:   Diagnosis Date    Arrhythmia     Arthritis     Asthma (HHS-HCC)     Coronary artery disease     History of recent steroid use     Irregular heart beat        Past Surgical History:   Procedure Laterality Date    CARDIAC ELECTROPHYSIOLOGY MAPPING AND ABLATION         Patient  has no history on file for sexual activity.    Family History   Problem Relation Name Age of Onset    Diabetes Mother      Other (HLD) Mother      Coronary artery disease Father         No Known Allergies    Prior to Admission medications    Medication Sig Start Date End Date Taking? Authorizing Provider   albuterol 90 mcg/actuation inhaler Inhale 2 puffs every 4 hours if needed. 19   Historical Provider, MD   cetirizine (ZyrTEC) 10 mg tablet TAKE 1 TABLET BY MOUTH EVERY DAY FOR 7 DAYS 23   Historical Provider, MD   esomeprazole (NexIUM) 20 mg DR capsule Take by mouth.    " Historical Provider, MD   flecainide (Tambocor) 50 mg tablet Take 1 tablet (50 mg) by mouth twice a day. 9/21/23   Historical Provider, MD   fluticasone (Flonase) 50 mcg/actuation nasal spray Administer 2 sprays into each nostril once daily. 1/31/24 4/30/24  Paul Ureña DO   meloxicam (Mobic) 15 mg tablet Take 1 tablet (15 mg) by mouth once daily. 11/2/23   Historical Provider, MD   metoprolol succinate XL (Toprol-XL) 50 mg 24 hr tablet Take 1 tablet (50 mg) by mouth 2 times a day.    Historical Provider, MD          Review of Systems  Constitutional: NO F, chills, or sweats  Eyes: glasses, no blurred vision or visual disturbance  ENT: see HPI  Cardiovascular: if skips medication or strenuous physicial--+palpitations, with dizziness and SOB, no chest pain, no edema, and no syncope.   Respiratory: ANDRE, no cough,no s.o.b. and no wheezing  Gastrointestinal: no abdominal pain, no N/V, no blood in stools  Genitourinary: no dysuria, no urinary frequency, no urinary hesitancy and no feelings of urinary urgency.   Musculoskeletal: no arthralgias, no back pain and no myalgias.   Integumentary: no new skin lesions and no rashes.   Neurological: no difficulty walking, no headache, no limb weakness, no numbness and no tingling.   Endocrine: no recent weight gain and no recent weight loss.   Hematologic/Lymphatic: no tendency for easy bruising and no swollen glands.      Physical Exam  Constitutional:       Appearance: Normal appearance.   Cardiovascular:      Rate and Rhythm: Normal rate.      Heart sounds: Normal heart sounds.   Pulmonary:      Effort: Pulmonary effort is normal.      Breath sounds: Normal breath sounds.   Abdominal:      General: Bowel sounds are normal.      Palpations: Abdomen is soft.   Musculoskeletal:         General: Normal range of motion.      Cervical back: Normal range of motion.      Right lower leg: No edema.      Left lower leg: No edema.   Skin:     General: Skin is warm and dry.    Neurological:      Mental Status: He is alert and oriented to person, place, and time.          PAT AIRWAY:   Airway:     Mallampati::  II    TM distance::  <3 FB    Neck ROM::  Full  normal        Visit Vitals  /79   Pulse 67   Temp 36 °C (96.8 °F) (Temporal)   Resp 20       DASI Risk Score      Flowsheet Row Most Recent Value   DASI SCORE 50.7   METS Score (Will be calculated only when all the questions are answered) 9          Caprini DVT Assessment    No data to display       Modified Frailty Index    No data to display       CHADS2 Stroke Risk         N/A 3 to 100%: High Risk   2 to < 3%: Medium Risk   0 to < 2%: Low Risk     Last Change: N/A          This score determines the patient's risk of having a stroke if the patient has atrial fibrillation.        This score is not applicable to this patient. Components are not calculated.          Revised Cardiac Risk Index    No data to display       Apfel Simplified Score    No data to display       Risk Analysis Index Results This Encounter    No data found in the last 1 encounters.       Stop Bang Score      Flowsheet Row Most Recent Value   Do you snore loudly? 1   Do you often feel tired or fatigued after your sleep? 0   Has anyone ever observed you stop breathing in your sleep? 0   Do you have or are you being treated for high blood pressure? 1   Recent BMI (Calculated) 30.5   Is BMI greater than 35 kg/m2? 0=No   Age older than 50 years old? 0=No   Is your neck circumference greater than 17 inches (Male) or 16 inches (Female)? 0   Gender - Male 1=Yes   STOP-BANG Total Score 3            ASSESSMENT  Obesity BMI 30.51    SVT/CAD  Takes Flecainide, Metoprolol  Follows Dr. Mccormick  +palpitations without medication or with strenuous activity  ECG 6/14/2024- NSR, 66 bpm    Asthma-stable  Albuterol PRN    ANESTHESIA FINDINGS:  Intubation History: No history of difficult intubation  Significant Anesthesia Considerations:      Airway History: No abnormal  airway history  Predictors of Difficult Airway Management     PLAN  This patient is optimally prepared for surgery.     CONSULTS:    Patient does not require consults for optimization at this time.     The Following Tests/Procedures Have Been Initiated:  CBC, BMP, Coag screen,ECG     Planned Anesthetic: general     Instructions Given to Patient:  Patient given verbal and written preop instructions and voices comprehension and compliance.     No further testing required.

## 2024-06-14 NOTE — PREPROCEDURE INSTRUCTIONS
Medication List            Accurate as of June 14, 2024  8:08 AM. Always use your most recent med list.                albuterol 90 mcg/actuation inhaler  Medication Adjustments for Surgery: Take morning of surgery with sip of water, no other fluids     cetirizine 10 mg tablet  Commonly known as: ZyrTEC  Medication Adjustments for Surgery: Other (Comment)  Notes to patient: Do not take 6/27 or 6/28     esomeprazole 20 mg DR capsule  Commonly known as: NexIUM  Medication Adjustments for Surgery: Take morning of surgery with sip of water, no other fluids     flecainide 50 mg tablet  Commonly known as: Tambocor  Medication Adjustments for Surgery: Continue until night before surgery  Notes to patient: Continue to take at 6pm     fluticasone 50 mcg/actuation nasal spray  Commonly known as: Flonase  Administer 2 sprays into each nostril once daily.  Medication Adjustments for Surgery: Other (Comment)  Notes to patient: Take if you need      metoprolol succinate XL 50 mg 24 hr tablet  Commonly known as: Toprol-XL  Medication Adjustments for Surgery: Continue until night before surgery  Notes to patient: Continue to take at 6pm                              NPO Instructions:    Do not eat any food after midnight the night before your surgery/procedure.    Additional Instructions:     Wear  comfortable loose fitting clothing  Do not use moisturizers, creams, lotions or perfume  All jewelry and valuables should be left at home                          PRE-OPERATIVE INSTRUCTIONS FOR SURGERY    *Do not eat anything after midnight the night of surgery.  This includes food of any kind (including hard candy, cough drops, mints).   You may have up to 13.5 ounces of clear liquid  until TWO hours prior to your arrival time to the hospital.  This includes water, black tea/coffee, (no milk or cream) apple juice and electrolyte drinks (GATORADE).  You may chew gum until TWO hours prior you your surgery/procedure.         *One of our  staff members will call you ONE business day before your surgery, between 11am-2 pm to let you know the time to arrive.  If you have not received a call by 2 pm, call [phone]  *When you arrive at the hospital-->GO TO Registration on the ground floor  *Stop smoking 24 hours prior to surgery.  No Marijuana, CBD Oil or Vaping for 48 hours  *No alcohol 24 hours prior to surgery  *You will need a responsible adult to drive you home  -No acrylic nails or nail polish on at least one fingernail, NO polish on toes for foot surgery  -You may be asked to remove your dentures, partial plate, eyeglasses or contact lenses before going to surgery.  Please bring a case for these items.  -Body piercings need to be removed.  Jewelry and valuables should be left at home.  -Put on loose,  comfortable, clean clothing, that will accommodate bandages      What you may be asked to bring to surgery:  ___Crutches, walker  ___CPAP machine  ___Urine specimen

## 2024-06-14 NOTE — CPM/PAT H&P
"CPM/PAT Evaluation       Name: Pepe Whatley (Pepe Whatley)  /Age: 1975/48 y.o.     In-Person       Chief Complaint:     48 yr old male with c/o sinus issues    He denies allergies or nasal injuries   He endorses sinus issues since his 20's---> had nasal surgery in [facility], with improvement  States has been getting a cold---\"that goes to chest and treated by PCP or UC\"    He c/o nasal congestion bilaterally that fluctuates from right to left  He c/o PND with rare nasal drainage.   Endorses h/o sinus infection, with most recent incident last month.  States sense of smell sometimes ok    Denies difficulties with taste, swallowing or hearing.    He has tried nasal sprays, humidier, OTC medications.    Denies any cardiac or respiratory symptoms.  Denies any past issues with anesthesia.    ENDOSCOPIC SINUS SURGERY WITH IMAGE GUIDANCE/ INFERIOR TURBINATE REDUCTIONS/ POSSIBLE SEPTOPLASTY is Scheduled on 2024  with Dr. Glass     Vitals:    24 0745   BP: 116/79   Pulse: 67   Resp: 20   Temp: 36 °C (96.8 °F)   SpO2: 97%          Past Medical History:   Diagnosis Date    Arrhythmia     Arthritis     Asthma (HHS-HCC)     Coronary artery disease     History of recent steroid use     Irregular heart beat        Past Surgical History:   Procedure Laterality Date    CARDIAC ELECTROPHYSIOLOGY MAPPING AND ABLATION         Patient  has no history on file for sexual activity.    Family History   Problem Relation Name Age of Onset    Diabetes Mother      Other (HLD) Mother      Coronary artery disease Father         No Known Allergies    Prior to Admission medications    Medication Sig Start Date End Date Taking? Authorizing Provider   albuterol 90 mcg/actuation inhaler Inhale 2 puffs every 4 hours if needed. 19   Historical Provider, MD   cetirizine (ZyrTEC) 10 mg tablet TAKE 1 TABLET BY MOUTH EVERY DAY FOR 7 DAYS 23   Historical Provider, MD   esomeprazole (NexIUM) 20 mg DR capsule Take by mouth.    " Historical Provider, MD   flecainide (Tambocor) 50 mg tablet Take 1 tablet (50 mg) by mouth twice a day. 9/21/23   Historical Provider, MD   fluticasone (Flonase) 50 mcg/actuation nasal spray Administer 2 sprays into each nostril once daily. 1/31/24 4/30/24  Paul Ureña DO   meloxicam (Mobic) 15 mg tablet Take 1 tablet (15 mg) by mouth once daily. 11/2/23   Historical Provider, MD   metoprolol succinate XL (Toprol-XL) 50 mg 24 hr tablet Take 1 tablet (50 mg) by mouth 2 times a day.    Historical Provider, MD          Review of Systems  Constitutional: NO F, chills, or sweats  Eyes: glasses, no blurred vision or visual disturbance  ENT: see HPI  Cardiovascular: if skips medication or strenuous physicial--+palpitations, with dizziness and SOB, no chest pain, no edema, and no syncope.   Respiratory: ANDRE, no cough,no s.o.b. and no wheezing  Gastrointestinal: no abdominal pain, no N/V, no blood in stools  Genitourinary: no dysuria, no urinary frequency, no urinary hesitancy and no feelings of urinary urgency.   Musculoskeletal: no arthralgias, no back pain and no myalgias.   Integumentary: no new skin lesions and no rashes.   Neurological: no difficulty walking, no headache, no limb weakness, no numbness and no tingling.   Endocrine: no recent weight gain and no recent weight loss.   Hematologic/Lymphatic: no tendency for easy bruising and no swollen glands.      Physical Exam  Constitutional:       Appearance: Normal appearance.   Cardiovascular:      Rate and Rhythm: Normal rate.      Heart sounds: Normal heart sounds.   Pulmonary:      Effort: Pulmonary effort is normal.      Breath sounds: Normal breath sounds.   Abdominal:      General: Bowel sounds are normal.      Palpations: Abdomen is soft.   Musculoskeletal:         General: Normal range of motion.      Cervical back: Normal range of motion.      Right lower leg: No edema.      Left lower leg: No edema.   Skin:     General: Skin is warm and dry.    Neurological:      Mental Status: He is alert and oriented to person, place, and time.          PAT AIRWAY:   Airway:     Mallampati::  II    TM distance::  <3 FB    Neck ROM::  Full  normal        Visit Vitals  /79   Pulse 67   Temp 36 °C (96.8 °F) (Temporal)   Resp 20       DASI Risk Score      Flowsheet Row Most Recent Value   DASI SCORE 50.7   METS Score (Will be calculated only when all the questions are answered) 9          Caprini DVT Assessment    No data to display       Modified Frailty Index    No data to display       CHADS2 Stroke Risk         N/A 3 to 100%: High Risk   2 to < 3%: Medium Risk   0 to < 2%: Low Risk     Last Change: N/A          This score determines the patient's risk of having a stroke if the patient has atrial fibrillation.        This score is not applicable to this patient. Components are not calculated.          Revised Cardiac Risk Index    No data to display       Apfel Simplified Score    No data to display       Risk Analysis Index Results This Encounter    No data found in the last 1 encounters.       Stop Bang Score      Flowsheet Row Most Recent Value   Do you snore loudly? 1   Do you often feel tired or fatigued after your sleep? 0   Has anyone ever observed you stop breathing in your sleep? 0   Do you have or are you being treated for high blood pressure? 1   Recent BMI (Calculated) 30.5   Is BMI greater than 35 kg/m2? 0=No   Age older than 50 years old? 0=No   Is your neck circumference greater than 17 inches (Male) or 16 inches (Female)? 0   Gender - Male 1=Yes   STOP-BANG Total Score 3            ASSESSMENT  Obesity BMI 30.51    SVT/CAD  Takes Flecainide, Metoprolol  Follows Dr. Mccormick  +palpitations without medication or with strenuous activity  ECG 6/14/2024- NSR, 66 bpm    Asthma-stable  Albuterol PRN    ANESTHESIA FINDINGS:  Intubation History: No history of difficult intubation  Significant Anesthesia Considerations:      Airway History: No abnormal  airway history  Predictors of Difficult Airway Management     PLAN  This patient is optimally prepared for surgery.     CONSULTS:    Patient does not require consults for optimization at this time.     The Following Tests/Procedures Have Been Initiated:  CBC, BMP, Coag screen,ECG     Planned Anesthetic: general     Instructions Given to Patient:  Patient given verbal and written preop instructions and voices comprehension and compliance.     No further testing required.

## 2024-06-18 DIAGNOSIS — J33.9 NASAL POLYP: ICD-10-CM

## 2024-06-18 RX ORDER — PREDNISONE 10 MG/1
TABLET ORAL
Qty: 9 TABLET | Refills: 0 | Status: SHIPPED | OUTPATIENT
Start: 2024-06-18 | End: 2024-06-28 | Stop reason: HOSPADM

## 2024-06-19 LAB
ATRIAL RATE: 66 BPM
P AXIS: 11 DEGREES
P OFFSET: 196 MS
P ONSET: 150 MS
PR INTERVAL: 146 MS
Q ONSET: 223 MS
QRS COUNT: 11 BEATS
QRS DURATION: 104 MS
QT INTERVAL: 400 MS
QTC CALCULATION(BAZETT): 419 MS
QTC FREDERICIA: 413 MS
R AXIS: -8 DEGREES
T AXIS: 14 DEGREES
T OFFSET: 423 MS
VENTRICULAR RATE: 66 BPM

## 2024-06-28 ENCOUNTER — HOSPITAL ENCOUNTER (OUTPATIENT)
Facility: HOSPITAL | Age: 49
Setting detail: OUTPATIENT SURGERY
Discharge: HOME | End: 2024-06-28
Attending: STUDENT IN AN ORGANIZED HEALTH CARE EDUCATION/TRAINING PROGRAM | Admitting: STUDENT IN AN ORGANIZED HEALTH CARE EDUCATION/TRAINING PROGRAM
Payer: COMMERCIAL

## 2024-06-28 ENCOUNTER — ANESTHESIA EVENT (OUTPATIENT)
Dept: OPERATING ROOM | Facility: HOSPITAL | Age: 49
End: 2024-06-28
Payer: COMMERCIAL

## 2024-06-28 ENCOUNTER — ANESTHESIA (OUTPATIENT)
Dept: OPERATING ROOM | Facility: HOSPITAL | Age: 49
End: 2024-06-28
Payer: COMMERCIAL

## 2024-06-28 VITALS
WEIGHT: 181.66 LBS | BODY MASS INDEX: 29.2 KG/M2 | DIASTOLIC BLOOD PRESSURE: 56 MMHG | RESPIRATION RATE: 18 BRPM | OXYGEN SATURATION: 96 % | TEMPERATURE: 97.5 F | HEIGHT: 66 IN | HEART RATE: 79 BPM | SYSTOLIC BLOOD PRESSURE: 112 MMHG

## 2024-06-28 DIAGNOSIS — J31.0 CHRONIC RHINITIS: ICD-10-CM

## 2024-06-28 DIAGNOSIS — J33.9 NOSE POLYP: ICD-10-CM

## 2024-06-28 DIAGNOSIS — J34.89 NASAL OBSTRUCTION: ICD-10-CM

## 2024-06-28 DIAGNOSIS — G89.18 POSTOPERATIVE PAIN: ICD-10-CM

## 2024-06-28 DIAGNOSIS — J34.2 DEVIATED NASAL SEPTUM: ICD-10-CM

## 2024-06-28 DIAGNOSIS — J34.3 HYPERTROPHY OF NASAL TURBINATES: ICD-10-CM

## 2024-06-28 DIAGNOSIS — J33.9 MULTIPLE NASAL POLYPS: ICD-10-CM

## 2024-06-28 DIAGNOSIS — J32.4 CHRONIC PANSINUSITIS: Primary | ICD-10-CM

## 2024-06-28 PROCEDURE — 2500000004 HC RX 250 GENERAL PHARMACY W/ HCPCS (ALT 636 FOR OP/ED): Performed by: STUDENT IN AN ORGANIZED HEALTH CARE EDUCATION/TRAINING PROGRAM

## 2024-06-28 PROCEDURE — 31267 ENDOSCOPY MAXILLARY SINUS: CPT | Performed by: STUDENT IN AN ORGANIZED HEALTH CARE EDUCATION/TRAINING PROGRAM

## 2024-06-28 PROCEDURE — 7100000001 HC RECOVERY ROOM TIME - INITIAL BASE CHARGE: Performed by: STUDENT IN AN ORGANIZED HEALTH CARE EDUCATION/TRAINING PROGRAM

## 2024-06-28 PROCEDURE — 2500000005 HC RX 250 GENERAL PHARMACY W/O HCPCS: Performed by: STUDENT IN AN ORGANIZED HEALTH CARE EDUCATION/TRAINING PROGRAM

## 2024-06-28 PROCEDURE — 31259 NSL/SINS NDSC SPHN TISS RMVL: CPT | Performed by: STUDENT IN AN ORGANIZED HEALTH CARE EDUCATION/TRAINING PROGRAM

## 2024-06-28 PROCEDURE — 30140 RESECT INFERIOR TURBINATE: CPT | Performed by: STUDENT IN AN ORGANIZED HEALTH CARE EDUCATION/TRAINING PROGRAM

## 2024-06-28 PROCEDURE — 2500000005 HC RX 250 GENERAL PHARMACY W/O HCPCS: Performed by: ANESTHESIOLOGIST ASSISTANT

## 2024-06-28 PROCEDURE — 3600000003 HC OR TIME - INITIAL BASE CHARGE - PROCEDURE LEVEL THREE: Performed by: STUDENT IN AN ORGANIZED HEALTH CARE EDUCATION/TRAINING PROGRAM

## 2024-06-28 PROCEDURE — 7100000009 HC PHASE TWO TIME - INITIAL BASE CHARGE: Performed by: STUDENT IN AN ORGANIZED HEALTH CARE EDUCATION/TRAINING PROGRAM

## 2024-06-28 PROCEDURE — 3600000008 HC OR TIME - EACH INCREMENTAL 1 MINUTE - PROCEDURE LEVEL THREE: Performed by: STUDENT IN AN ORGANIZED HEALTH CARE EDUCATION/TRAINING PROGRAM

## 2024-06-28 PROCEDURE — 61782 SCAN PROC CRANIAL EXTRA: CPT | Performed by: STUDENT IN AN ORGANIZED HEALTH CARE EDUCATION/TRAINING PROGRAM

## 2024-06-28 PROCEDURE — 2500000004 HC RX 250 GENERAL PHARMACY W/ HCPCS (ALT 636 FOR OP/ED): Performed by: ANESTHESIOLOGY

## 2024-06-28 PROCEDURE — 2500000004 HC RX 250 GENERAL PHARMACY W/ HCPCS (ALT 636 FOR OP/ED): Performed by: ANESTHESIOLOGIST ASSISTANT

## 2024-06-28 PROCEDURE — 7100000010 HC PHASE TWO TIME - EACH INCREMENTAL 1 MINUTE: Performed by: STUDENT IN AN ORGANIZED HEALTH CARE EDUCATION/TRAINING PROGRAM

## 2024-06-28 PROCEDURE — 31276 NSL/SINS NDSC FRNT TISS RMVL: CPT | Performed by: STUDENT IN AN ORGANIZED HEALTH CARE EDUCATION/TRAINING PROGRAM

## 2024-06-28 PROCEDURE — 2720000007 HC OR 272 NO HCPCS: Performed by: STUDENT IN AN ORGANIZED HEALTH CARE EDUCATION/TRAINING PROGRAM

## 2024-06-28 PROCEDURE — C2625 STENT, NON-COR, TEM W/DEL SY: HCPCS | Performed by: STUDENT IN AN ORGANIZED HEALTH CARE EDUCATION/TRAINING PROGRAM

## 2024-06-28 PROCEDURE — 2780000003 HC OR 278 NO HCPCS: Performed by: STUDENT IN AN ORGANIZED HEALTH CARE EDUCATION/TRAINING PROGRAM

## 2024-06-28 PROCEDURE — 3700000002 HC GENERAL ANESTHESIA TIME - EACH INCREMENTAL 1 MINUTE: Performed by: STUDENT IN AN ORGANIZED HEALTH CARE EDUCATION/TRAINING PROGRAM

## 2024-06-28 PROCEDURE — 88305 TISSUE EXAM BY PATHOLOGIST: CPT | Performed by: PATHOLOGY

## 2024-06-28 PROCEDURE — 0753T DGTZ GLS MCRSCP SLD LEVEL IV: CPT | Mod: TC,PARLAB | Performed by: STUDENT IN AN ORGANIZED HEALTH CARE EDUCATION/TRAINING PROGRAM

## 2024-06-28 PROCEDURE — 3700000001 HC GENERAL ANESTHESIA TIME - INITIAL BASE CHARGE: Performed by: STUDENT IN AN ORGANIZED HEALTH CARE EDUCATION/TRAINING PROGRAM

## 2024-06-28 PROCEDURE — 7100000002 HC RECOVERY ROOM TIME - EACH INCREMENTAL 1 MINUTE: Performed by: STUDENT IN AN ORGANIZED HEALTH CARE EDUCATION/TRAINING PROGRAM

## 2024-06-28 DEVICE — IMPLANT, PROPEL CONTOUR SINUS: Type: IMPLANTABLE DEVICE | Site: NOSE | Status: FUNCTIONAL

## 2024-06-28 RX ORDER — METOPROLOL TARTRATE 1 MG/ML
5 INJECTION, SOLUTION INTRAVENOUS ONCE
Status: DISCONTINUED | OUTPATIENT
Start: 2024-06-28 | End: 2024-06-28 | Stop reason: HOSPADM

## 2024-06-28 RX ORDER — REMIFENTANIL HYDROCHLORIDE 1 MG/ML
INJECTION, POWDER, LYOPHILIZED, FOR SOLUTION INTRAVENOUS CONTINUOUS PRN
Status: DISCONTINUED | OUTPATIENT
Start: 2024-06-28 | End: 2024-06-28

## 2024-06-28 RX ORDER — MEPERIDINE HYDROCHLORIDE 25 MG/ML
12.5 INJECTION INTRAMUSCULAR; INTRAVENOUS; SUBCUTANEOUS EVERY 10 MIN PRN
Status: DISCONTINUED | OUTPATIENT
Start: 2024-06-28 | End: 2024-06-28 | Stop reason: HOSPADM

## 2024-06-28 RX ORDER — LABETALOL HYDROCHLORIDE 5 MG/ML
10 INJECTION, SOLUTION INTRAVENOUS ONCE AS NEEDED
Status: DISCONTINUED | OUTPATIENT
Start: 2024-06-28 | End: 2024-06-28 | Stop reason: HOSPADM

## 2024-06-28 RX ORDER — ALBUTEROL SULFATE 0.83 MG/ML
2.5 SOLUTION RESPIRATORY (INHALATION) ONCE AS NEEDED
Status: DISCONTINUED | OUTPATIENT
Start: 2024-06-28 | End: 2024-06-28 | Stop reason: HOSPADM

## 2024-06-28 RX ORDER — LIDOCAINE HYDROCHLORIDE AND EPINEPHRINE 10; 10 MG/ML; UG/ML
INJECTION, SOLUTION INFILTRATION; PERINEURAL AS NEEDED
Status: DISCONTINUED | OUTPATIENT
Start: 2024-06-28 | End: 2024-06-28 | Stop reason: HOSPADM

## 2024-06-28 RX ORDER — DOCUSATE SODIUM 100 MG/1
100 CAPSULE, LIQUID FILLED ORAL 2 TIMES DAILY
Qty: 30 CAPSULE | Refills: 0 | Status: SHIPPED | OUTPATIENT
Start: 2024-06-28 | End: 2024-07-13

## 2024-06-28 RX ORDER — FENTANYL CITRATE 50 UG/ML
INJECTION, SOLUTION INTRAMUSCULAR; INTRAVENOUS AS NEEDED
Status: DISCONTINUED | OUTPATIENT
Start: 2024-06-28 | End: 2024-06-28

## 2024-06-28 RX ORDER — LIDOCAINE HCL/PF 100 MG/5ML
SYRINGE (ML) INTRAVENOUS AS NEEDED
Status: DISCONTINUED | OUTPATIENT
Start: 2024-06-28 | End: 2024-06-28

## 2024-06-28 RX ORDER — PROPOFOL 10 MG/ML
INJECTION, EMULSION INTRAVENOUS AS NEEDED
Status: DISCONTINUED | OUTPATIENT
Start: 2024-06-28 | End: 2024-06-28

## 2024-06-28 RX ORDER — ONDANSETRON HYDROCHLORIDE 2 MG/ML
INJECTION, SOLUTION INTRAVENOUS AS NEEDED
Status: DISCONTINUED | OUTPATIENT
Start: 2024-06-28 | End: 2024-06-28

## 2024-06-28 RX ORDER — ROCURONIUM BROMIDE 10 MG/ML
INJECTION, SOLUTION INTRAVENOUS AS NEEDED
Status: DISCONTINUED | OUTPATIENT
Start: 2024-06-28 | End: 2024-06-28

## 2024-06-28 RX ORDER — SCOLOPAMINE TRANSDERMAL SYSTEM 1 MG/1
1 PATCH, EXTENDED RELEASE TRANSDERMAL ONCE
Status: DISCONTINUED | OUTPATIENT
Start: 2024-06-28 | End: 2024-06-28 | Stop reason: HOSPADM

## 2024-06-28 RX ORDER — HYDRALAZINE HYDROCHLORIDE 20 MG/ML
10 INJECTION INTRAMUSCULAR; INTRAVENOUS EVERY 30 MIN PRN
Status: DISCONTINUED | OUTPATIENT
Start: 2024-06-28 | End: 2024-06-28 | Stop reason: HOSPADM

## 2024-06-28 RX ORDER — MORPHINE SULFATE 2 MG/ML
2 INJECTION, SOLUTION INTRAMUSCULAR; INTRAVENOUS EVERY 5 MIN PRN
Status: DISCONTINUED | OUTPATIENT
Start: 2024-06-28 | End: 2024-06-28 | Stop reason: HOSPADM

## 2024-06-28 RX ORDER — SODIUM CHLORIDE, SODIUM LACTATE, POTASSIUM CHLORIDE, CALCIUM CHLORIDE 600; 310; 30; 20 MG/100ML; MG/100ML; MG/100ML; MG/100ML
100 INJECTION, SOLUTION INTRAVENOUS CONTINUOUS
Status: DISCONTINUED | OUTPATIENT
Start: 2024-06-28 | End: 2024-06-28 | Stop reason: HOSPADM

## 2024-06-28 RX ORDER — CEFAZOLIN SODIUM 2 G/100ML
2 INJECTION, SOLUTION INTRAVENOUS ONCE
Status: COMPLETED | OUTPATIENT
Start: 2024-06-28 | End: 2024-06-28

## 2024-06-28 RX ORDER — FLUTICASONE PROPIONATE 50 MCG
2 SPRAY, SUSPENSION (ML) NASAL 2 TIMES DAILY
Qty: 48 ML | Refills: 3 | Status: SHIPPED | OUTPATIENT
Start: 2024-06-28

## 2024-06-28 RX ORDER — ONDANSETRON HYDROCHLORIDE 2 MG/ML
4 INJECTION, SOLUTION INTRAVENOUS ONCE AS NEEDED
Status: DISCONTINUED | OUTPATIENT
Start: 2024-06-28 | End: 2024-06-28 | Stop reason: HOSPADM

## 2024-06-28 RX ORDER — MIDAZOLAM HYDROCHLORIDE 1 MG/ML
1 INJECTION, SOLUTION INTRAMUSCULAR; INTRAVENOUS ONCE AS NEEDED
Status: DISCONTINUED | OUTPATIENT
Start: 2024-06-28 | End: 2024-06-28 | Stop reason: HOSPADM

## 2024-06-28 RX ORDER — DIPHENHYDRAMINE HYDROCHLORIDE 50 MG/ML
25 INJECTION INTRAMUSCULAR; INTRAVENOUS ONCE AS NEEDED
Status: DISCONTINUED | OUTPATIENT
Start: 2024-06-28 | End: 2024-06-28 | Stop reason: HOSPADM

## 2024-06-28 RX ORDER — ACETAMINOPHEN 325 MG/1
975 TABLET ORAL ONCE
Status: DISCONTINUED | OUTPATIENT
Start: 2024-06-28 | End: 2024-06-28 | Stop reason: HOSPADM

## 2024-06-28 RX ORDER — FAMOTIDINE 10 MG/ML
20 INJECTION INTRAVENOUS ONCE
Status: DISCONTINUED | OUTPATIENT
Start: 2024-06-28 | End: 2024-06-28 | Stop reason: HOSPADM

## 2024-06-28 RX ORDER — HYDROMORPHONE HYDROCHLORIDE 1 MG/ML
1 INJECTION, SOLUTION INTRAMUSCULAR; INTRAVENOUS; SUBCUTANEOUS EVERY 5 MIN PRN
Status: DISCONTINUED | OUTPATIENT
Start: 2024-06-28 | End: 2024-06-28 | Stop reason: HOSPADM

## 2024-06-28 RX ORDER — OXYCODONE AND ACETAMINOPHEN 5; 325 MG/1; MG/1
1 TABLET ORAL EVERY 6 HOURS PRN
Qty: 12 TABLET | Refills: 0 | Status: SHIPPED | OUTPATIENT
Start: 2024-06-28 | End: 2024-07-01

## 2024-06-28 RX ORDER — MIDAZOLAM HYDROCHLORIDE 1 MG/ML
INJECTION, SOLUTION INTRAMUSCULAR; INTRAVENOUS AS NEEDED
Status: DISCONTINUED | OUTPATIENT
Start: 2024-06-28 | End: 2024-06-28

## 2024-06-28 RX ORDER — METOPROLOL TARTRATE 1 MG/ML
INJECTION, SOLUTION INTRAVENOUS AS NEEDED
Status: DISCONTINUED | OUTPATIENT
Start: 2024-06-28 | End: 2024-06-28

## 2024-06-28 RX ADMIN — MEPERIDINE HYDROCHLORIDE 12.5 MG: 25 INJECTION INTRAMUSCULAR; INTRAVENOUS; SUBCUTANEOUS at 16:03

## 2024-06-28 RX ADMIN — MEPERIDINE HYDROCHLORIDE 12.5 MG: 25 INJECTION INTRAMUSCULAR; INTRAVENOUS; SUBCUTANEOUS at 16:13

## 2024-06-28 SDOH — HEALTH STABILITY: MENTAL HEALTH: CURRENT SMOKER: 0

## 2024-06-28 ASSESSMENT — PAIN - FUNCTIONAL ASSESSMENT: PAIN_FUNCTIONAL_ASSESSMENT: 0-10

## 2024-06-28 ASSESSMENT — PAIN SCALES - GENERAL
PAINLEVEL_OUTOF10: 0 - NO PAIN
PAIN_LEVEL: 0

## 2024-06-28 ASSESSMENT — COLUMBIA-SUICIDE SEVERITY RATING SCALE - C-SSRS
2. HAVE YOU ACTUALLY HAD ANY THOUGHTS OF KILLING YOURSELF?: NO
6. HAVE YOU EVER DONE ANYTHING, STARTED TO DO ANYTHING, OR PREPARED TO DO ANYTHING TO END YOUR LIFE?: NO
1. IN THE PAST MONTH, HAVE YOU WISHED YOU WERE DEAD OR WISHED YOU COULD GO TO SLEEP AND NOT WAKE UP?: NO

## 2024-06-28 NOTE — ADDENDUM NOTE
Addendum  created 06/28/24 1546 by CALIXTO Guerrero    Child order released for a procedure order, Clinical Note Signed, Intraprocedure Blocks edited, Intraprocedure Event deleted, Intraprocedure Event edited, Intraprocedure Meds edited, LDA created via procedure documentation, SmartForm saved

## 2024-06-28 NOTE — OP NOTE
OPERATIVE NOTE    Date:  2024 OR Location: [facility]    Name: Pepe Whatley : 1975, Age: 48 y.o., MRN: [id number], Sex: male    Surgeons      Mars Glass MD    Resident/Fellow/Other Assistant  None were associated with this case.    Anesthesia: General  ASA: III  Anesthesia Staff: Anesthesiologist: Rambo Stahl MD; Wilmer Condon MD; Ervin Charlton MD  C-AA: CALIXTO Guerrero  Staff: Circulator: Rylie Salamanca RN  Relief Circulator: Yola Latif RN  Relief Scrub: Shannon Blair RN  Scrub Person: Betzaida Murillo       Preoperative Diagnosis:  1.  Chronic sinusitis  2.  Nasal polyposis  3.  Inferior turbinate hypertrophy  4.  Nasal obstruction    Postoperative Diagnosis:  1.  Chronic sinusitis    Procedure Performed:  1.  Bilateral sphenoidotomies with removal of tissue and total ethmoidectomies (CPT 35922-66)  2.  Bilateral maxillary antrostomies with removal of tissue (CPT 66500-03)  3.  Bilateral frontal explorations (CPT 91698-62)  4.  Bilateral inferior turbinate reductions (CPT 35689-13)  5.  Extra-dural image guidance (CPT 78397)    Indications:  Pepe Whatley is a 48 y.o. male who presented with symptoms concerning for chronic sinusitis. The patient remained symptomatic after appropriate medical therapy; posttreatment imaging demonstrated chronic sinusitis.  The patient was therefore offered operative intervention; the risks, benefits, and alternatives were discussed in the office.  See outpatient notes for further details.     Operative Findings:  1.  On initial nasal endoscopy there is very mild residual nasal septal deviation right to left.  However, with inferior turbinate reductions no revision septoplasty was deemed needed.  Bilateral nasal polyposis was appreciated.  Throughout our dissection of the paranasal sinuses we encountered trapped inspissated secretions and thick mucus.  Polypoid edema was noted throughout the paranasal sinuses.  2.  Propel contour  stents were placed bilaterally.  3.  Hemopore was placed bilaterally.    Operative Technique:   The patient was seen in the preoperative area where consent was confirmed and all questions were answered. The patient was brought back to the operating room where a full team timeout was performed. The patient was then induced and intubated by the anesthesia team. The table was turned 90 degrees. [place] oxymetazoline was applied to both nasal cavities. The patient was placed in slight reverse Trendelenburg position.    The patient´s CT and/or MRI scans were loaded onto the image guidance system. The system was then registered to the patient and was confirmed in three planes. Image guidance was used throughout the case to confirm the location of vital neurovascular structures. Image guidance was indicated given presence of nasal polyposis and revision surgery.    The patient was prepped and draped in a sterile fashion and a second confirmatory timeout was performed.     We began first with evaluation of the nasal cavities with a 0 degree endoscope bilaterally; the above findings were noted. We injected the axillae of the middle turbinates and the heads of the inferior turbinates with local 1% lidocaine with 1:100,000 epinephrine.    We next used a combination of the [place] and [place] elevators to out-fracture the inferior turbinates bilaterally.    We next continued with Right-sided maxillary antrostomy with removal of tissue.  The middle turbinate was sharply resected to its base which was cauterized.  Polypoid tissue at the middle meatus was resected and the prior antrostomy was identified.  A retrograde resection of the remnant uncinate was performed using the backbiter. All free tissue edges were cleaned with the microdebrider. The maxillary antrostomy was continued posteriorly using a straight thru-cut. The antrostomy was widely opened and all edges cleaned using a straight thru-cut, backbiter, and microdebrider.  Polypoid tissue and thick inspissated secretions within the maxillary sinus were resected. Later, an angled endoscope was used to confirm the inclusion of the natural drainage pathway of the sinus into our antrostomy. All anterior remnant uncinate was removed up to the maxillary line.  No damage to the nasolacrimal duct was noted.    We continued with Right-sided sphenoidotomy with total ethmoidectomy.  Nasal polyp mixed with remnant ethmoid air cells were resected using a combination of sharp instrumentation and the microdebrider. We traversed the basal lamella and identified the superior turbinate. The inferior-most aspect of the turbinate was resected with a straight thru-cut until the area of the presumed sphenoid os could be visualized. The entrance to the sphenoid was confirmed with our navigation probe. The sphenoid os was entered and expanded with the [place] elevator and then widened with the use of [place] rongeurs. Polypoid tissue within the sphenoid sinus was resected. All loose edges were cleaned with the microdebrider. The skull base and orbital apex were identified and confirmed using image guidance. Moving from posterior to anterior we then proceed to use a combination of sharp instrumentation and the microdebrider to remove all ethmoid air cells to the level of the skull base. The angled endoscope was used later to confirm these resections. No damage to the skull base, anterior ethmoid artery, nor lamina papyraceae was appreciated.    The paranasal sinuses were irrigated with copious, warm normal saline and oxymetazoline on pledgets was applied to the skull base.    We next proceeded to the contralateral side to begin Left-sided maxillary antrostomy with removal of tissue.  The middle turbinate was sharply resected to its base which was cauterized.  Polypoid tissue was resected at the middle meatus until we identified the previous maxillary antrostomy.. A retrograde resection of the remnant uncinate  was performed using the backbiter. All free tissue edges were cleaned with the microdebrider. The maxillary antrostomy was continued posteriorly using a straight thru-cut. The antrostomy was widely opened and all edges cleaned using a straight thru-cut, backbiter, and microdebrider. Polypoid tissue within the maxillary sinus was resected. Later, an angled endoscope was used to confirm the inclusion of the natural drainage pathway of the sinus into our antrostomy. All anterior remnant uncinate was removed up to the maxillary line.    We continued with Left-sided sphenoidotomy with total ethmoidectomy. The ethmoid bulla was identified and resected using a combination of sharp instrumentation and the microdebrider. We traversed the basal lamella and identified the superior turbinate. The inferior-most aspect of the turbinate was resected with a straight thru-cut until the area of the presumed sphenoid os could be visualized. The entrance to the sphenoid was confirmed with our navigation probe. The sphenoid os was entered and expanded with the Anne elevator and then widened with the use of [place] rongeurs. Polypoid tissue within the sphenoid sinus was resected. All loose edges were cleaned with the microdebrider. The skull base and orbital apex were identified and confirmed using image guidance. Moving from posterior to anterior we then proceed to use a combination of sharp instrumentation and the microdebrider to remove all ethmoid air cells to the level of the skull base. The angled endoscope was used later to confirm these resections. No damage to the skull base, anterior ethmoid artery, nor lamina papyraceae was appreciated.    The paranasal sinuses were irrigated with copious, warm normal saline and oxymetazoline on pledgets was applied to the skull base.    We next performed Right-sided frontal sinus exploration and sinusotomy. Following the skull base with angled endoscopes and instrumentation we identified  the frontal sinus outflow tract. The air cells at the entrance of the frontal sinus were resected and the os widened using angled instrumentation.    The paranasal sinuses were irrigated with copious, warm normal saline and oxymetazoline on pledgets was applied to the skull base.    We next performed Left-sided frontal sinus exploration and sinusotomy. Following the skull base with angled endoscopes and instrumentation we identified the frontal sinus outflow tract. The air cells at the entrance of the frontal sinus were resected and the os widened using angled instrumentation.    The paranasal sinuses were irrigated with copious, warm normal saline and oxymetazoline on pledgets was applied to the skull base.    Finally, we performed inferior turbinate reductions.  Stab incisions were made at the heads of the inferior turbinates.  The caudal elevator was used to develop a submucosal pocket overlying the medial aspect of the inferior turbinate.  We then used the inferior turbinate blade on the microdebrider to perform submucosal resection.  The posterior aspect of the inferior turbinates was reduced with bipolar cautery.    Finally, hemostasis was confirmed following a final irrigation of the paranasal sinuses bilaterally.  Propel contour stents were placed at the frontal sinus outflow tracts bilaterally.  Hemopore was applied to the middle meati bilaterally to aid in hemostasis and healing and to prevent lateralization of the remnant middle turbinates bilaterally.    An orogastric tube was passed into the stomach and the gastric contents were evacuated.    All instrumentation was then removed from the patient who was then turned back and returned to the care of the anesthesia team for emergence and extubation. The patient was transported to the recovery area. There were no immediate complications appreciated.    Attending Attestation: I performed the procedure.    Implants:   Implant Name Type Inv. Item Serial No.   Lot No. LRB No. Used Action   IMPLANT, PROPEL CONTOUR SINUS - [id number] ENT Implant IMPLANT, PROPEL CONTOUR SINUS  INTERSECT ENT [id number] Left 1 Implanted   IMPLANT, PROPEL CONTOUR SINUS - [id number] ENT Implant IMPLANT, PROPEL CONTOUR SINUS  INTERSECT ENT [id number] Right 1 Implanted     Specimens:   ID Type Source Tests Collected by Time   1 : LEFT SINUS CONTENTS Tissue SINUS CONTENTS LEFT SURGICAL PATHOLOGY EXAM Mars Glass MD 6/28/2024 3330   2 : RIGHT SINUS CONTENTS Tissue SINUS CONTENTS RIGHT SURGICAL PATHOLOGY EXAM Mars Glass MD 6/28/2024 1457     Estimated Blood Loss: 500 mL  Complications: none  Condition of the patient: Stable  Disposition: PACU    Mars Glass MD  [phone]

## 2024-06-28 NOTE — ANESTHESIA PROCEDURE NOTES
Airway  Date/Time: 6/28/2024 11:04 AM  Urgency: elective    Airway not difficult    Staffing  Performed: CALIXTO   Authorized by: Rambo Stahl MD    Performed by: CALIXTO Guerrero  Patient location during procedure: OR    Indications and Patient Condition  Indications for airway management: anesthesia  Spontaneous Ventilation: absent  Sedation level: deep  Preoxygenated: yes  Patient position: sniffing  Mask difficulty assessment: 1 - vent by mask    Final Airway Details  Final airway type: endotracheal airway      Successful airway: ETT     Successful intubation technique: video laryngoscopy  Facilitating devices/methods: intubating stylet  ETT size (mm): 7.5  Cormack-Lehane Classification: grade I - full view of glottis  Placement verified by: capnometry   Measured from: lips  Number of attempts at approach: 1

## 2024-06-28 NOTE — DISCHARGE INSTRUCTIONS
Congratulations on having surgery! The following instructions will help you know what to expect in the days following your surgery. Do not hesitate to call if you have questions or concerns.    After surgery, please have the following items available at home:  - NeilMed Sinus Rinse® bottle for post-surgical nasal irrigations  - Oxymetazoline nasal spray (Afrin®) - to be used ONLY if you have a nosebleed  - 4x4 gauze and paper tape    Activities  - You may shower this evening.  - Avoid sneezing or blowing your nose for 2 weeks after surgery (gently sniff rather than blow. If you do sneeze, do so with your mouth open).  - Activities should be limited for 1 week after surgery.  - Do not lift heavy objects (greater than 10 pounds) for 1 week after surgery.  - Walking is strongly encouraged.  - Most patients do not return to work for about 5 to 7 days after surgery; however some return sooner, and others may need more time. The level of pain and frustration caused by the nasal congestion and blockage may be different from patient to patient.    Diet  Stick to lighter food for the 24 hours after surgery. Then you can resume your regular diet.    Pain Control  - Many patients do not have much pain after nasal surgery, but everyone is different.  - Most patients feel pressure and congestion.  - For mild pain, acetaminophen (Tylenol®) should work well.  - For stronger pain you may be prescribed a narcotic medication. Drink plenty of water as these stronger pain medications can be constipating. Also take the prescribed stool softeners on a regular basis until your bowel movements have returned to normal.  - Avoid taking aspirin, ibuprofen (Advil®, Motrin®), naproxen (Aleve®, Naprosyn®), and other nonsteroidal anti-inflammatory medications (NSAIDs) for a week following nasal surgery. These drugs can increase bleeding.  - Pain should lessen with time. If pain increases, call the office.    Drainage  You can expect to have  bloody nasal drainage after nasal or sinus surgery. To catch this drainage and to avoid continuous nose wiping we recommend using a gauze “mustache” dressing under the nose. Tape this to the cheeks for as long as the drainage continues.    NASAL SALINE IRRIGATION: THIS IS VERY IMPORTANT  - After sinus surgery, we like to have the nose irrigated with a NeilMed Sinus Rinse® bottle and a special saline solution (irrigation instructions and saline solution recipe are below). This will help rinse the blood clots and mucous from the nose.  - START IRRIGATIONS THE DAY AFTER SURGERY.  - Never use your fingers to clear blockages in your nose.    How to Irrigate:  - Fill the NeilMed Sinus Rinse® bottle with the room temperature saline solution (see below for recipe). Gently insert the tip into one nostril and squeeze. Keep your head down to prevent water from going back down into your throat. Use about one half of the bottle per nostril. Do this for each nostril 3-4 times daily for the first two weeks after surgery.    Nasal Irrigation Solution Recipe:  8 ounces of room temperature distilled water. (If you use tap water, boil it first and let it cool to room temperature.)  ½ teaspoon of table salt  ½ teaspoon of baking soda  You can use the NeilMed Sinus Rinse® solution packets if preferred.    BLEEDING  Some blood in your nasal drainage after surgery is expected. This may be dark red or brown. If the nose is bleeding freely or you think the amount is too much, call the office immediately.  Avoid nose blowing and try to sneeze with your mouth open if needed.  Sleeping with your head elevated will also help prevent bleeding and reduce congestion.  If you have a nosebleed, you can try spraying oxymetazoline spray (Afrin®) in the nose.    ANTIBIOTICS  You may be given antibiotics, which you should begin taking the day after surgery. If you were not prescribed antibiotics disregard this info.    STEROIDS  You may be given  steroids, which you should begin taking the day after surgery. If you were not prescribed steroids disregard this info.    Miscellaneous  A low-grade fever, less than 101° F is not uncommon for 2 to 3 days after surgery. If fever continues or is higher than 101° F, call your physician. Use acetaminophen to control the fever.    Follow-up  Your appointment for follow up after your surgery should have been scheduled at the time of your surgery. If not, please call the office for an appointment scheduled for 1-2 weeks after the date of your surgery.    Call the office or GO TO THE EMERGENCY ROOM if you have:  - Excessive pain, swelling, bleeding or drainage  - Shortness of breath or difficulty breathing  - Persistent nausea and vomiting  - Fever that continues or is higher than 101° F  - Neck stiffness (cannot touch your chin to your chest)  - Confusion  - Worsening headaches  - Clear drainage dripping from your nose like a leaky faucet (note that this may happen and is normal up to 30 minutes following saline sprays)  - Salty or metallic taste (note that you may experience a salty taste which is normal up to 30 minutes following saline sprays and irrigations)    Do not hesitate to call if you have any questions or concerns:  - Please call Dr. ELOINA Glass’s office at [phone] during normal business hours.  - After hours the office number will direct you to an answering service that will contact Dr. Glass.  - If Dr. Glass is not available for emergency questions you can call [facility] at [phone] and request to speak to the “ENT resident physician on call.”

## 2024-06-28 NOTE — ADDENDUM NOTE
Addendum  created 06/28/24 1621 by Rambo Stahl MD    Attestation recorded in Intraprocedure, Intraprocedure Attestations filed

## 2024-06-28 NOTE — ANESTHESIA PREPROCEDURE EVALUATION
Patient: Pepe Whatley    Procedure Information       Date/Time: 06/28/24 1015    Procedure: ENDOSCOPIC SINUS SURGERY WITH IMAGE GUIDANCE/ INFERIOR TURBINATE REDUCTIONS/ POSSIBLE SEPTOPLASTY (Bilateral)    Location: [facility] 04 / [facility]    Surgeons: Mars Glass MD            Relevant Problems   Anesthesia   (-) Difficult intubation   (-) PONV (postoperative nausea and vomiting)      Cardiac   (+) Atypical chest pain   (+) Cardiac arrhythmia   (+) SVT (supraventricular tachycardia) (CMS-HCC)      Pulmonary   (+) Acute asthma exacerbation (HHS-HCC)      HEENT   (+) Chronic sinusitis   (+) Sinobronchitis       Clinical information reviewed:   Tobacco  Allergies  Meds   Med Hx  Surg Hx   Fam Hx  Soc Hx        NPO Detail:  No data recorded     Physical Exam    Airway  Mallampati: II  TM distance: >3 FB  Neck ROM: full     Cardiovascular - normal exam  Rhythm: regular  Rate: normal     Dental    Pulmonary - normal exam     Abdominal            Anesthesia Plan    History of general anesthesia?: yes  History of complications of general anesthesia?: no    ASA 3     MAC     The patient is not a current smoker.  Education provided regarding risk of obstructive sleep apnea.  intravenous induction   Postoperative administration of opioids is intended.  Trial extubation is planned.  Anesthetic plan and risks discussed with patient.    Plan discussed with CRNA, CAA and attending.

## 2024-06-28 NOTE — ANESTHESIA POSTPROCEDURE EVALUATION
Patient: Pepe Whatley    Procedure Summary       Date: 06/28/24 Room / Location: [facility] 04 / [facility]    Anesthesia Start: 1042 Anesthesia Stop: 1527    Procedure: ENDOSCOPIC SINUS SURGERY WITH IMAGE GUIDANCE/ INFERIOR TURBINATE REDUCTIONS/ POSSIBLE SEPTOPLASTY (Bilateral) Diagnosis:       Chronic pansinusitis      Nose polyp      Nasal obstruction      Deviated nasal septum      Hypertrophy of nasal turbinates      (Chronic pansinusitis [J32.4])      (Nose polyp [J33.9])      (Nasal obstruction [J34.89])      (Deviated nasal septum [J34.2])      (Hypertrophy of nasal turbinates [J34.3])    Surgeons: Mars Glass MD Responsible Provider: Rambo Stahl MD    Anesthesia Type: MAC ASA Status: 3            Anesthesia Type: MAC    Vitals Value Taken Time   /77 06/28/24 1526   Temp 36.4 °C (97.5 °F) 06/28/24 1526   Pulse 80 06/28/24 1526   Resp 16 06/28/24 1526   SpO2 100 % 06/28/24 1526       Anesthesia Post Evaluation    Patient location during evaluation: PACU  Patient participation: complete - patient participated  Level of consciousness: awake  Pain score: 0  Pain management: adequate  Airway patency: patent  Cardiovascular status: acceptable  Respiratory status: acceptable  Hydration status: acceptable  Postoperative Nausea and Vomiting: none        There were no known notable events for this encounter.

## 2024-07-03 ENCOUNTER — OFFICE VISIT (OUTPATIENT)
Dept: OTOLARYNGOLOGY | Facility: CLINIC | Age: 49
End: 2024-07-03
Payer: COMMERCIAL

## 2024-07-03 VITALS
DIASTOLIC BLOOD PRESSURE: 90 MMHG | SYSTOLIC BLOOD PRESSURE: 125 MMHG | HEART RATE: 92 BPM | TEMPERATURE: 98.6 F | OXYGEN SATURATION: 99 % | RESPIRATION RATE: 16 BRPM

## 2024-07-03 DIAGNOSIS — J33.9 NOSE POLYP: ICD-10-CM

## 2024-07-03 DIAGNOSIS — J31.0 CHRONIC RHINITIS: ICD-10-CM

## 2024-07-03 DIAGNOSIS — J32.4 CHRONIC PANSINUSITIS: Primary | ICD-10-CM

## 2024-07-03 PROCEDURE — 1036F TOBACCO NON-USER: CPT | Performed by: STUDENT IN AN ORGANIZED HEALTH CARE EDUCATION/TRAINING PROGRAM

## 2024-07-03 PROCEDURE — 99211 OFF/OP EST MAY X REQ PHY/QHP: CPT | Performed by: STUDENT IN AN ORGANIZED HEALTH CARE EDUCATION/TRAINING PROGRAM

## 2024-07-03 ASSESSMENT — ENCOUNTER SYMPTOMS
LOSS OF SENSATION IN FEET: 0
DEPRESSION: 0
OCCASIONAL FEELINGS OF UNSTEADINESS: 0

## 2024-07-03 ASSESSMENT — PAIN SCALES - GENERAL: PAINLEVEL: 6

## 2024-07-03 NOTE — PROGRESS NOTES
"SUBJECTIVE  Patient ID: Pepe Whatley is a 48 y.o. male who presents for Post-op Visit (sinus).    History 3/28/2024:  Referred by my partner for surgery; needs hospital setting for surgery due to SVT.    The patient reports prior surgery for his sinuses some 12 years ago; performed in [facility]. He notes constant nasal congestion/obstruction. Sense of smell is \"okay.\" They deny nasal drainage, facial pressure, facial pain, and epistaxis. They deny a history of environmental allergies; no formal allergy testing.    Previously tried steroid spray (Flonase) and oral steroids .     Update 7/3/2024:  Postop visit status post bilateral endoscopic sinus surgery.  He has been having a fair amount of postnasal drainage as well as occasional bleeding.  Was doing fairly well but over the last several days has had an increase in pain.  Denies any CSF leak concerns.    OBJECTIVE  Physical Exam  CONSTITUTIONAL: Well appearing male who appears stated age.  PSYCHIATRIC: Alert, appropriate mood and affect.  RESPIRATORY: Normal inspiration and expiration and chest wall expansion; no use of accessory muscles to breathe.  VOICE: Clear speech without hoarseness. No stridor nor stertor.  HEAD, FACE, AND SKIN: Symmetric facial feature.  NOSE: Nasal dorsum was midline. Anterior rhinoscopy was limited to the heads of the inferior turbinates.  Inferior turbinates are well lateralized and reduced although mildly edematous today.  Fair amount of anterior edema so I advised against endoscopy at today's visit.  Some packing and blood suctioned on anterior rhinoscopy.    Radiology  CT sinus 2/15/2024: I personally reviewed with the patient his recent imaging.  This demonstrates varying levels of pansinusitis with thick mucosal thickening at the middle meatus and ostiomeatal complex consistent with no nasal polyposis.  Patient's imaging suggest likely prior septoplasty although there is some residual right superior septal deviation.  " Inferior turbinates are quite hypertrophied.        ASSESSMENT/PLAN  Diagnoses and all orders for this visit:  Chronic pansinusitis  Chronic rhinitis  Nose polyp        48 y.o. male presenting with longstanding history of chronic sinusitis with nasal polyposis.    1.  Chronic sinusitis, nasal polyposis, nasal obstruction, NSD/ITH  The patient has longstanding history of chronic sinusitis.  He reports prior surgery some decade ago.  On nasal endoscopy the patient has fidelia nasal polyps obstructing the middle meatus bilaterally.  Imaging demonstrates varying levels of pansinusitis.  The patient was referred for consideration of surgery in the hospital setting.    Patient is now status post bilateral pansinus endoscopic sinus surgery with image guidance, inferior turbinate reductions 6/28/2024.  Patient appears to be doing well but currently has a fair amount of anterior edema.  Reassurance provided.  Recommended patient continue frequent irrigations.  Also okay to restart home Aleve that he has been taking for arthritis.  In addition, recommended he start fluticasone nasal spray twice daily.    Follow-up in 1 to 2 weeks for formal evaluation with nasal endoscopy.    This note was created using speech recognition transcription software. Despite proofreading, typographical errors may be present that affect the meaning of the content. Please contact my office with any questions.

## 2024-07-11 ENCOUNTER — OFFICE VISIT (OUTPATIENT)
Dept: OTOLARYNGOLOGY | Facility: CLINIC | Age: 49
End: 2024-07-11
Payer: COMMERCIAL

## 2024-07-11 VITALS
TEMPERATURE: 98.8 F | WEIGHT: 180.8 LBS | DIASTOLIC BLOOD PRESSURE: 78 MMHG | OXYGEN SATURATION: 97 % | BODY MASS INDEX: 27.4 KG/M2 | HEIGHT: 68 IN | SYSTOLIC BLOOD PRESSURE: 116 MMHG | HEART RATE: 85 BPM

## 2024-07-11 DIAGNOSIS — J32.4 CHRONIC PANSINUSITIS: Primary | ICD-10-CM

## 2024-07-11 DIAGNOSIS — J34.3 HYPERTROPHY OF NASAL TURBINATES: ICD-10-CM

## 2024-07-11 DIAGNOSIS — J33.9 NASAL POLYPS: ICD-10-CM

## 2024-07-11 DIAGNOSIS — J34.89 NASAL CRUSTING: ICD-10-CM

## 2024-07-11 PROCEDURE — 1036F TOBACCO NON-USER: CPT | Performed by: STUDENT IN AN ORGANIZED HEALTH CARE EDUCATION/TRAINING PROGRAM

## 2024-07-11 PROCEDURE — 31237 NSL/SINS NDSC SURG BX POLYPC: CPT | Mod: 50,79 | Performed by: STUDENT IN AN ORGANIZED HEALTH CARE EDUCATION/TRAINING PROGRAM

## 2024-07-11 PROCEDURE — 31237 NSL/SINS NDSC SURG BX POLYPC: CPT | Performed by: STUDENT IN AN ORGANIZED HEALTH CARE EDUCATION/TRAINING PROGRAM

## 2024-07-11 PROCEDURE — 99211 OFF/OP EST MAY X REQ PHY/QHP: CPT | Performed by: STUDENT IN AN ORGANIZED HEALTH CARE EDUCATION/TRAINING PROGRAM

## 2024-07-11 RX ORDER — FLUTICASONE PROPIONATE 93 UG/1
1 SPRAY, METERED NASAL 2 TIMES DAILY
Qty: 16 ML | Refills: 11 | Status: SHIPPED | OUTPATIENT
Start: 2024-07-11

## 2024-07-11 ASSESSMENT — ENCOUNTER SYMPTOMS
OCCASIONAL FEELINGS OF UNSTEADINESS: 0
LOSS OF SENSATION IN FEET: 0
DEPRESSION: 0

## 2024-07-11 ASSESSMENT — PAIN SCALES - GENERAL: PAINLEVEL: 0-NO PAIN

## 2024-07-11 NOTE — PROGRESS NOTES
"SUBJECTIVE  Patient ID: Pepe Whatley is a 48 y.o. male who presents for Post-op (Pepe is here for a Post-op visit.).    History 3/28/2024:  Referred by my partner for surgery; needs hospital setting for surgery due to SVT.    The patient reports prior surgery for his sinuses some 12 years ago; performed in [facility]. He notes constant nasal congestion/obstruction. Sense of smell is \"okay.\" They deny nasal drainage, facial pressure, facial pain, and epistaxis. They deny a history of environmental allergies; no formal allergy testing.    Previously tried steroid spray (Flonase) and oral steroids .     Update 7/3/2024:  Postop visit status post bilateral endoscopic sinus surgery.  He has been having a fair amount of postnasal drainage as well as occasional bleeding.  Was doing fairly well but over the last several days has had an increase in pain.  Denies any CSF leak concerns.    Update 7/11/2024:  Post-op visit.  Patient reports that pain significantly improved when he restarted his home Aleve.  Notes persistent nasal congestion.  Not having significant nasal drainage.  Irrigating frequently.  Using fluticasone nasal spray twice daily.    OBJECTIVE  Physical Exam  CONSTITUTIONAL: Well appearing male who appears stated age.  PSYCHIATRIC: Alert, appropriate mood and affect.  RESPIRATORY: Normal inspiration and expiration and chest wall expansion; no use of accessory muscles to breathe.  VOICE: Clear speech without hoarseness. No stridor nor stertor.  HEAD, FACE, AND SKIN: Symmetric facial feature.  NOSE: Nasal dorsum was midline.  Postoperative evaluation necessitated endoscopy, see procedure below.    Radiology  CT sinus 2/15/2024: I personally reviewed with the patient his recent imaging.  This demonstrates varying levels of pansinusitis with thick mucosal thickening at the middle meatus and ostiomeatal complex consistent with no nasal polyposis.  Patient's imaging suggest likely prior septoplasty although " there is some residual right superior septal deviation.  Inferior turbinates are quite hypertrophied.      --------------------------------------------------  NOTE: Nasal endoscopy is performed for evaluation of the paranasal sinuses related to chronic sinusitis and is unrelated to any concerns regarding septoplasty or skull base surgery, if performed.    Procedure: Nasal endoscopy with debridement, bilateral  Indication: Chronic rhinosinusitis, nasal crusting, bilateral  Informed consent verbally obtained: The risks, benefits, alternatives, and expectations were discussed with the patient, who wished to proceed  Anesthesia: Topical lidocaine/oxymetazoline    Findings: After topical anesthetic was applied the nasal cavities were examined with a 30 degree endoscope. The septum was relatively midline.  - Right: Large crusts were debrided from the anterior nasal chambers with a combination of suction and straight forceps. Crusts were removed from the middle meatus and ethmoid cavity.     - The inferior turbinate is somewhat edematous but well lateralized.  The middle turbinate is been resected.     - The maxillary and ethmoid sinuses were patent with expected polypoid edema.  Natural drainage pathway open and confirmed with instrumentation.     - The sphenoid sinus was patent with expected polypoid edema.     - The frontal recess was patent.     - There was no evidence of CSF leak. The mucosa is healing appropriately.  - Left: Large crusts were debrided from the anterior nasal chambers with a combination of suction and straight forceps. Crusts were removed from the middle meatus and ethmoid cavity.     - The inferior turbinate is somewhat edematous but well lateralized.  The middle turbinate is been resected.     - The maxillary and ethmoid sinuses were patent with expected polypoid edema.  Natural drainage pathway open and confirmed with instrumentation.     - The sphenoid sinus was patent with expected polypoid  edema. Some nasal polyp removed with instrumentation.     - The frontal recess was patent.     - There was no evidence of CSF leak. The mucosa is healing appropriately.    There were no complications and the patient tolerated the procedure well.  --------------------------------------------------    ASSESSMENT/PLAN  Diagnoses and all orders for this visit:  Nasal crusting  Nasal polyps  Chronic pansinusitis  Hypertrophy of nasal turbinates      48 y.o. male presenting with longstanding history of chronic sinusitis with nasal polyposis.    1.  Chronic sinusitis, nasal polyposis, nasal obstruction, NSD/ITH  The patient has longstanding history of chronic sinusitis.  He reports prior surgery some decade ago.  On nasal endoscopy the patient has fidelia nasal polyps obstructing the middle meatus bilaterally.  Imaging demonstrates varying levels of pansinusitis.  The patient was referred for consideration of surgery in the hospital setting.    Patient is now status post bilateral pansinus endoscopic sinus surgery with image guidance, inferior turbinate reductions 6/28/2024.  Intraoperatively patient had significant nasal polyposis with trapped inspissated secretions throughout.  Final pathology pending.  On initial postoperative debridement the patient is healing well with patent sinuses and expected polypoid edema.  Given the extent of his edema, we are going to see if we can get approval for Xhance for the patient.  In the meantime I recommended the patient continue his frequent irrigations and twice daily fluticasone nasal spray.  He will follow-up in 2 weeks.    This note was created using speech recognition transcription software. Despite proofreading, typographical errors may be present that affect the meaning of the content. Please contact my office with any questions.

## 2024-07-23 LAB
LABORATORY COMMENT REPORT: NORMAL
PATH REPORT.FINAL DX SPEC: NORMAL
PATH REPORT.GROSS SPEC: NORMAL
PATH REPORT.RELEVANT HX SPEC: NORMAL
PATH REPORT.TOTAL CANCER: NORMAL

## 2024-07-26 ENCOUNTER — OFFICE VISIT (OUTPATIENT)
Dept: OTOLARYNGOLOGY | Facility: CLINIC | Age: 49
End: 2024-07-26
Payer: COMMERCIAL

## 2024-07-26 VITALS
WEIGHT: 181 LBS | OXYGEN SATURATION: 98 % | HEART RATE: 72 BPM | DIASTOLIC BLOOD PRESSURE: 90 MMHG | BODY MASS INDEX: 26.81 KG/M2 | HEIGHT: 69 IN | SYSTOLIC BLOOD PRESSURE: 129 MMHG

## 2024-07-26 DIAGNOSIS — J32.4 CHRONIC PANSINUSITIS: ICD-10-CM

## 2024-07-26 DIAGNOSIS — J31.0 CHRONIC RHINITIS: Primary | ICD-10-CM

## 2024-07-26 DIAGNOSIS — J34.89 NASAL OBSTRUCTION: ICD-10-CM

## 2024-07-26 DIAGNOSIS — J34.3 HYPERTROPHY OF NASAL TURBINATES: ICD-10-CM

## 2024-07-26 DIAGNOSIS — J34.89 NASAL CRUSTING: ICD-10-CM

## 2024-07-26 DIAGNOSIS — J33.9 NASAL POLYPS: ICD-10-CM

## 2024-07-26 PROCEDURE — 31237 NSL/SINS NDSC SURG BX POLYPC: CPT | Mod: 50,79 | Performed by: STUDENT IN AN ORGANIZED HEALTH CARE EDUCATION/TRAINING PROGRAM

## 2024-07-26 PROCEDURE — 31237 NSL/SINS NDSC SURG BX POLYPC: CPT | Performed by: STUDENT IN AN ORGANIZED HEALTH CARE EDUCATION/TRAINING PROGRAM

## 2024-07-26 PROCEDURE — 3008F BODY MASS INDEX DOCD: CPT | Performed by: STUDENT IN AN ORGANIZED HEALTH CARE EDUCATION/TRAINING PROGRAM

## 2024-07-26 PROCEDURE — 99211 OFF/OP EST MAY X REQ PHY/QHP: CPT | Performed by: STUDENT IN AN ORGANIZED HEALTH CARE EDUCATION/TRAINING PROGRAM

## 2024-07-26 PROCEDURE — 1036F TOBACCO NON-USER: CPT | Performed by: STUDENT IN AN ORGANIZED HEALTH CARE EDUCATION/TRAINING PROGRAM

## 2024-07-26 RX ORDER — PREDNISONE 10 MG/1
TABLET ORAL
COMMUNITY
Start: 2024-07-21

## 2024-07-26 RX ORDER — TRIAMCINOLONE ACETONIDE 1 MG/G
CREAM TOPICAL 2 TIMES DAILY
COMMUNITY
Start: 2024-07-21 | End: 2024-07-28

## 2024-07-26 SDOH — ECONOMIC STABILITY: FOOD INSECURITY: WITHIN THE PAST 12 MONTHS, YOU WORRIED THAT YOUR FOOD WOULD RUN OUT BEFORE YOU GOT MONEY TO BUY MORE.: NEVER TRUE

## 2024-07-26 SDOH — ECONOMIC STABILITY: FOOD INSECURITY: WITHIN THE PAST 12 MONTHS, THE FOOD YOU BOUGHT JUST DIDN'T LAST AND YOU DIDN'T HAVE MONEY TO GET MORE.: NEVER TRUE

## 2024-07-26 ASSESSMENT — COLUMBIA-SUICIDE SEVERITY RATING SCALE - C-SSRS
2. HAVE YOU ACTUALLY HAD ANY THOUGHTS OF KILLING YOURSELF?: NO
1. IN THE PAST MONTH, HAVE YOU WISHED YOU WERE DEAD OR WISHED YOU COULD GO TO SLEEP AND NOT WAKE UP?: NO
6. HAVE YOU EVER DONE ANYTHING, STARTED TO DO ANYTHING, OR PREPARED TO DO ANYTHING TO END YOUR LIFE?: NO

## 2024-07-26 ASSESSMENT — LIFESTYLE VARIABLES
AUDIT-C TOTAL SCORE: 0
HOW OFTEN DO YOU HAVE SIX OR MORE DRINKS ON ONE OCCASION: NEVER
HOW MANY STANDARD DRINKS CONTAINING ALCOHOL DO YOU HAVE ON A TYPICAL DAY: PATIENT DOES NOT DRINK
HOW OFTEN DO YOU HAVE A DRINK CONTAINING ALCOHOL: NEVER
SKIP TO QUESTIONS 9-10: 1

## 2024-07-26 ASSESSMENT — ENCOUNTER SYMPTOMS
LOSS OF SENSATION IN FEET: 0
OCCASIONAL FEELINGS OF UNSTEADINESS: 0
DEPRESSION: 0

## 2024-07-26 ASSESSMENT — PAIN SCALES - GENERAL: PAINLEVEL: 0-NO PAIN

## 2024-07-26 NOTE — PROGRESS NOTES
"SUBJECTIVE  Patient ID: Pepe Whatley is a 48 y.o. male who presents for Post-op.    History 3/28/2024:  Referred by my partner for surgery; needs hospital setting for surgery due to SVT.    The patient reports prior surgery for his sinuses some 12 years ago; performed in [facility]. He notes constant nasal congestion/obstruction. Sense of smell is \"okay.\" They deny nasal drainage, facial pressure, facial pain, and epistaxis. They deny a history of environmental allergies; no formal allergy testing.  Previously tried steroid spray (Flonase) and oral steroids .     Update 7/3/2024:  Postop visit status post bilateral endoscopic sinus surgery.  He has been having a fair amount of postnasal drainage as well as occasional bleeding.  Was doing fairly well but over the last several days has had an increase in pain.  Denies any CSF leak concerns.    Update 7/11/2024:  Post-op visit.  Patient reports that pain significantly improved when he restarted his home Aleve.  Notes persistent nasal congestion.  Not having significant nasal drainage.  Irrigating frequently.  Using fluticasone nasal spray twice daily.    Update 7/26/2024:  Postop visit.  Patient reports that he is doing well.  Significant improvement in nasal congestion.  Nasal breathing seems better on the right.  Irrigating daily.  Was approved for Xhance and has been using this twice daily.    OBJECTIVE  Physical Exam  CONSTITUTIONAL: Well appearing male who appears stated age.  PSYCHIATRIC: Alert, appropriate mood and affect.  RESPIRATORY: Normal inspiration and expiration and chest wall expansion; no use of accessory muscles to breathe.  VOICE: Clear speech without hoarseness. No stridor nor stertor.  HEAD, FACE, AND SKIN: Symmetric facial feature.  NOSE: Nasal dorsum was midline.  Nasal crusting appreciated on the left.  Slight edema noted along the septum and turbinates.  Postoperative evaluation necessitated endoscopy, see procedure " below.    Radiology  CT sinus 2/15/2024      --------------------------------------------------  NOTE: Nasal endoscopy is performed for evaluation of the paranasal sinuses related to chronic sinusitis and is unrelated to any concerns regarding septoplasty or skull base surgery, if performed.    Procedure: Nasal endoscopy with debridement, bilateral  Indication: Chronic rhinosinusitis, nasal crusting, bilateral  Informed consent verbally obtained: The risks, benefits, alternatives, and expectations were discussed with the patient, who wished to proceed  Anesthesia: Topical lidocaine/oxymetazoline    Findings: After topical anesthetic was applied the nasal cavities were examined with a 30 degree endoscope. The septum was relatively midline.  - Right: Large crusts were debrided from the anterior nasal chambers with a combination of suction and straight forceps. Crusts were removed from the middle meatus and ethmoid cavity.     - The inferior turbinate is somewhat edematous but well lateralized.  The middle turbinate has been resected.     - The maxillary and ethmoid sinuses were patent with expected polypoid edema.  Natural drainage pathway open.     - The sphenoid sinus was patent with expected polypoid edema.     - The frontal recess was widely patent.     - There was no evidence of CSF leak.  The mucosa is fairly polypoid at this point but appears to be healing appropriately.  - Left: Large crusts were debrided from the anterior nasal chambers with a combination of suction and straight forceps. Crusts were removed from the middle meatus and ethmoid cavity.     - The inferior turbinate remains somewhat edematous but is well lateralized; reduction sites noted to be healing.  The middle turbinate has been resected.     - The maxillary and ethmoid sinuses were patent with expected polypoid edema.  Natural drainage pathway open.     - The sphenoid sinus was patent with expected polypoid edema.     - The frontal recess  was widely patent.     - There was no evidence of CSF leak. The mucosa is fairly polypoid at this point but appears to be healing appropriately.    There were no complications and the patient tolerated the procedure well.  --------------------------------------------------    ASSESSMENT/PLAN  Diagnoses and all orders for this visit:  Nasal polyps  Nasal crusting  Chronic pansinusitis  Chronic rhinitis  Hypertrophy of nasal turbinates  Nasal obstruction    48 y.o. male presenting with longstanding history of chronic sinusitis with nasal polyposis.    1.  Chronic sinusitis, nasal polyposis, nasal obstruction, NSD/ITH  The patient has longstanding history of chronic sinusitis.  He reports prior surgery some decade ago.  On nasal endoscopy the patient has fidelia nasal polyps obstructing the middle meatus bilaterally.  Imaging demonstrates varying levels of pansinusitis.  The patient was referred for consideration of surgery in the hospital setting.    Patient is now status post bilateral pansinus endoscopic sinus surgery with image guidance, inferior turbinate reductions 6/28/2024.  Intraoperatively patient had significant nasal polyposis with trapped inspissated secretions throughout.  Final pathology consistent with chronic inflammation.  On the patient's second postoperative debridement he is healing well with patent to widely patent sinuses; there is and expected polypoid edema given the extent of his nasal polyposis. I recommended the patient continue his frequent irrigations and twice daily Xhance.  He will follow-up in 1 month.    Depending on how he continues to heal he may benefit from evaluation with allergy/immunology.    This note was created using speech recognition transcription software. Despite proofreading, typographical errors may be present that affect the meaning of the content. Please contact my office with any questions.

## 2024-08-06 ENCOUNTER — APPOINTMENT (OUTPATIENT)
Dept: ALLERGY | Facility: CLINIC | Age: 49
End: 2024-08-06
Payer: COMMERCIAL

## 2024-08-06 PROBLEM — R10.13 ABDOMINAL PAIN, EPIGASTRIC: Status: RESOLVED | Noted: 2018-02-26 | Resolved: 2024-08-06

## 2024-08-06 PROBLEM — I49.9 CARDIAC ARRHYTHMIA: Status: RESOLVED | Noted: 2018-02-26 | Resolved: 2024-08-06

## 2024-08-29 ENCOUNTER — OFFICE VISIT (OUTPATIENT)
Dept: OTOLARYNGOLOGY | Facility: CLINIC | Age: 49
End: 2024-08-29
Payer: COMMERCIAL

## 2024-08-29 ENCOUNTER — APPOINTMENT (OUTPATIENT)
Dept: OTOLARYNGOLOGY | Facility: CLINIC | Age: 49
End: 2024-08-29
Payer: COMMERCIAL

## 2024-08-29 VITALS
OXYGEN SATURATION: 99 % | DIASTOLIC BLOOD PRESSURE: 82 MMHG | RESPIRATION RATE: 16 BRPM | HEART RATE: 67 BPM | SYSTOLIC BLOOD PRESSURE: 118 MMHG | TEMPERATURE: 98.1 F

## 2024-08-29 DIAGNOSIS — J33.9 NASAL POLYPS: ICD-10-CM

## 2024-08-29 DIAGNOSIS — J33.9 MULTIPLE NASAL POLYPS: ICD-10-CM

## 2024-08-29 DIAGNOSIS — J31.0 CHRONIC RHINITIS: ICD-10-CM

## 2024-08-29 DIAGNOSIS — J32.4 CHRONIC PANSINUSITIS: Primary | ICD-10-CM

## 2024-08-29 PROCEDURE — 99213 OFFICE O/P EST LOW 20 MIN: CPT | Performed by: STUDENT IN AN ORGANIZED HEALTH CARE EDUCATION/TRAINING PROGRAM

## 2024-08-29 PROCEDURE — 31231 NASAL ENDOSCOPY DX: CPT | Performed by: STUDENT IN AN ORGANIZED HEALTH CARE EDUCATION/TRAINING PROGRAM

## 2024-08-29 PROCEDURE — 31231 NASAL ENDOSCOPY DX: CPT | Mod: 79 | Performed by: STUDENT IN AN ORGANIZED HEALTH CARE EDUCATION/TRAINING PROGRAM

## 2024-08-29 PROCEDURE — 1036F TOBACCO NON-USER: CPT | Performed by: STUDENT IN AN ORGANIZED HEALTH CARE EDUCATION/TRAINING PROGRAM

## 2024-08-29 RX ORDER — FLUTICASONE PROPIONATE 50 MCG
2 SPRAY, SUSPENSION (ML) NASAL EVERY 12 HOURS
Qty: 16 G | Refills: 11 | Status: SHIPPED | OUTPATIENT
Start: 2024-08-29 | End: 2025-08-29

## 2024-08-29 RX ORDER — OLOPATADINE HYDROCHLORIDE 665 UG/1
2 SPRAY NASAL 2 TIMES DAILY
Qty: 30.5 G | Refills: 11 | Status: SHIPPED | OUTPATIENT
Start: 2024-08-29

## 2024-08-29 ASSESSMENT — ENCOUNTER SYMPTOMS
DEPRESSION: 0
OCCASIONAL FEELINGS OF UNSTEADINESS: 0
LOSS OF SENSATION IN FEET: 0

## 2024-08-29 ASSESSMENT — PAIN SCALES - GENERAL: PAINLEVEL: 0-NO PAIN

## 2024-08-29 NOTE — PROGRESS NOTES
"SUBJECTIVE  Patient ID: Pepe Whatley is a 48 y.o. male who presents for Post-op.    History 3/28/2024:  Referred by my partner for surgery; needs hospital setting for surgery due to SVT.    The patient reports prior surgery for his sinuses some 12 years ago; performed in [facility]. He notes constant nasal congestion/obstruction. Sense of smell is \"okay.\" They deny nasal drainage, facial pressure, facial pain, and epistaxis. They deny a history of environmental allergies; no formal allergy testing.  Previously tried steroid spray (Flonase) and oral steroids .     Update 7/26/2024:  Postop visit.  Patient reports that he is doing well.  Significant improvement in nasal congestion.  Nasal breathing seems better on the right.  Irrigating daily.  Was approved for Xhance and has been using this twice daily.    Update 8/29/2024:  Follow-up visit for chronic sinusitis.  Patient reports he has been doing well although he has noted some increased nasal congestion in the last several weeks.  He has been using Xhance twice a day.  Has been irrigating at least daily.  No other concerns today.    OBJECTIVE  Physical Exam  CONSTITUTIONAL: Well appearing male who appears stated age.  PSYCHIATRIC: Alert, appropriate mood and affect.  RESPIRATORY: Normal inspiration and expiration and chest wall expansion; no use of accessory muscles to breathe.  VOICE: Clear speech without hoarseness. No stridor nor stertor.  HEAD, FACE, AND SKIN: Symmetric facial feature.  NOSE: Nasal dorsum was midline.  Persistent mild edema noted along the septum and turbinates.  The paranasal sinuses appear relatively clear.  Symptoms necessitated endoscopy, see procedure below.    Radiology  CT sinus 2/15/2024      --------------------------------------------------  NOTE: Nasal endoscopy is performed for evaluation of the paranasal sinuses related to chronic sinusitis and is unrelated to any concerns regarding septoplasty or skull base surgery, if " performed.    Procedure: Nasal endoscopy, diagnostic  Indication: Chronic rhinosinusitis, chronic rhinitis  Informed consent verbally obtained: The risks, benefits, alternatives, and expectations were discussed with the patient, who wished to proceed  Anesthesia: Topical lidocaine/oxymetazoline    Findings: After topical anesthetic was applied the nasal cavities were examined with a 30 degree endoscope. The septum was relatively midline.  - Right: Some small crusts were debrided from the anterior nasal chambers with a combination of suction and straight forceps. Crusts were removed from overlying the pterygopalatine fossa.     - The inferior turbinate is mildly edematous but well lateralized.  The middle turbinate has been resected.     - The maxillary sinus is patent although somewhat scarred.  The ethmoid sinuses were widely patent with persistent polypoid edema.     - The sphenoid sinus was widely patent with decreased polypoid edema.     - The frontal recess was widely patent with some scarring inferiorly and anteriorly.  There is some polypoid edema within the frontal sinus.  - Left: Small crusts were debrided from the anterior nasal chambers with a combination of suction and straight forceps. Crusts were removed from the middle meatus and ethmoid cavity.     - The inferior turbinate is mildly edematous.  The middle turbinate has been resected.     - The maxillary and ethmoid sinuses were patent with expected polypoid edema.  Slight narrowing of the antrostomy.     - The sphenoid sinus was widely patent with decreased polypoid edema.     - The frontal recess was widely patent with polypoid edema within the frontal sinus.    There were no complications and the patient tolerated the procedure well.  --------------------------------------------------    ASSESSMENT/PLAN  Diagnoses and all orders for this visit:  Chronic pansinusitis  Multiple nasal polyps  Nasal polyps  -     olopatadine (Patanase) 0.6 %  spray,non-aerosol nasal spray; Administer 2 sprays into affected nostril(s) 2 times a day.  -     fluticasone (Flonase) 50 mcg/actuation nasal spray; Administer 2 sprays into each nostril every 12 hours. Shake gently. Before first use, prime pump. After use, clean tip and replace cap.  Chronic rhinitis  -     olopatadine (Patanase) 0.6 % spray,non-aerosol nasal spray; Administer 2 sprays into affected nostril(s) 2 times a day.  -     fluticasone (Flonase) 50 mcg/actuation nasal spray; Administer 2 sprays into each nostril every 12 hours. Shake gently. Before first use, prime pump. After use, clean tip and replace cap.      48 y.o. male presenting with longstanding history of chronic sinusitis with nasal polyposis.    1.  Chronic sinusitis, nasal polyposis, nasal obstruction, NSD/ITH  The patient has longstanding history of chronic sinusitis.  He reports prior surgery some decade ago.  On nasal endoscopy the patient has fidelia nasal polyps obstructing the middle meatus bilaterally.  Imaging demonstrates varying levels of pansinusitis.  The patient was referred for consideration of surgery in the hospital setting.    Patient is now status post bilateral pansinus endoscopic sinus surgery with image guidance, inferior turbinate reductions 6/28/2024.  Intraoperatively patient had significant nasal polyposis with trapped inspissated secretions throughout.  Final pathology consistent with chronic inflammation.  In general the patient reports that he has been doing well but given some increase in nasal congestion repeat endoscopy was performed.  On endoscopy 8/29/2024 he has patent to widely patent sinuses; there is persistent polypoid edema not unexpeted given the extent of his nasal polyposis. He has had a very good surgical result.    Unfortunately, he reports that Xhance is going to become cost prohibitive.  I therefore recommended that he instead be more aggressive with twice daily fluticasone nasal spray.  We will also  start a twice daily antihistamine spray.  I encouraged at least daily irrigations.  We discussed potential evaluated allergy/immunology but for now we will defer testing and/or referral.  Follow-up in 3 to 4 months.    This note was created using speech recognition transcription software. Despite proofreading, typographical errors may be present that affect the meaning of the content. Please contact my office with any questions.

## 2024-11-27 ENCOUNTER — OFFICE VISIT (OUTPATIENT)
Dept: OTOLARYNGOLOGY | Facility: CLINIC | Age: 49
End: 2024-11-27
Payer: COMMERCIAL

## 2024-11-27 VITALS
HEART RATE: 74 BPM | SYSTOLIC BLOOD PRESSURE: 118 MMHG | DIASTOLIC BLOOD PRESSURE: 73 MMHG | BODY MASS INDEX: 29.08 KG/M2 | RESPIRATION RATE: 16 BRPM | OXYGEN SATURATION: 97 % | WEIGHT: 185.25 LBS | HEIGHT: 67 IN | TEMPERATURE: 98.2 F

## 2024-11-27 DIAGNOSIS — J34.89 NASAL OBSTRUCTION: ICD-10-CM

## 2024-11-27 DIAGNOSIS — J33.9 NASAL POLYPS: Primary | ICD-10-CM

## 2024-11-27 DIAGNOSIS — J31.0 CHRONIC RHINITIS: ICD-10-CM

## 2024-11-27 DIAGNOSIS — J32.4 CHRONIC PANSINUSITIS: ICD-10-CM

## 2024-11-27 PROCEDURE — 3008F BODY MASS INDEX DOCD: CPT | Performed by: STUDENT IN AN ORGANIZED HEALTH CARE EDUCATION/TRAINING PROGRAM

## 2024-11-27 PROCEDURE — 1036F TOBACCO NON-USER: CPT | Performed by: STUDENT IN AN ORGANIZED HEALTH CARE EDUCATION/TRAINING PROGRAM

## 2024-11-27 PROCEDURE — 99212 OFFICE O/P EST SF 10 MIN: CPT | Performed by: STUDENT IN AN ORGANIZED HEALTH CARE EDUCATION/TRAINING PROGRAM

## 2024-11-27 ASSESSMENT — ENCOUNTER SYMPTOMS
LOSS OF SENSATION IN FEET: 0
OCCASIONAL FEELINGS OF UNSTEADINESS: 0
DEPRESSION: 0

## 2024-11-27 ASSESSMENT — PAIN SCALES - GENERAL: PAINLEVEL_OUTOF10: 0-NO PAIN

## 2024-11-27 NOTE — PROGRESS NOTES
"SUBJECTIVE  Patient ID: Pepe Whatley is a 49 y.o. male who presents for Post-op.    History 3/28/2024:  Referred by my partner for surgery; needs hospital setting for surgery due to SVT.    The patient reports prior surgery for his sinuses some 12 years ago; performed in [facility]. He notes constant nasal congestion/obstruction. Sense of smell is \"okay.\" They deny nasal drainage, facial pressure, facial pain, and epistaxis. They deny a history of environmental allergies; no formal allergy testing.  Previously tried steroid spray (Flonase) and oral steroids .    Update 7/26/2024:  Postop visit.  Patient reports that he is doing well.  Significant improvement in nasal congestion.  Nasal breathing seems better on the right.  Irrigating daily.  Was approved for Xhance and has been using this twice daily.    Update 8/29/2024:  Follow-up visit for chronic sinusitis.  Patient reports he has been doing well although he has noted some increased nasal congestion in the last several weeks.  He has been using Xhance twice a day.  Has been irrigating at least daily.  No other concerns today.    Update 11/27/2024:  He reports that he is breathing well without much drainage. Sense of smell is good. He is using Patanase twice a day but stopped Flonase; there was some confusion about whether he should continue this. He is irrigating infrequently.    OBJECTIVE  Physical Exam  CONSTITUTIONAL: Well appearing male who appears stated age.  PSYCHIATRIC: Alert, appropriate mood and affect.  RESPIRATORY: Normal inspiration and expiration and chest wall expansion; no use of accessory muscles to breathe.  VOICE: Clear speech without hoarseness. No stridor nor stertor.  HEAD, FACE, AND SKIN: Symmetric facial feature.  EARS: Ear canals clear bilaterally.  Tympanic membranes intact and in neutral position.  NOSE: Nasal dorsum was midline.  Somewhat increased edema on anterior rhinoscopy today.  Some clear drainage on the right.  Paranasal " sinuses notably clear on the right.  OROPHARYNX: No drainage.    Radiology  CT sinus 2/15/2024      ASSESSMENT/PLAN  Diagnoses and all orders for this visit:  Nasal polyps  Chronic pansinusitis  Chronic rhinitis  Nasal obstruction    49 y.o. male presenting with longstanding history of chronic sinusitis with nasal polyposis.    1.  Chronic sinusitis, nasal polyposis, nasal obstruction, NSD/ITH  The patient has longstanding history of chronic sinusitis.  He reports prior surgery some decade ago.  On nasal endoscopy the patient has fidelia nasal polyps obstructing the middle meatus bilaterally.  Imaging demonstrates varying levels of pansinusitis.  The patient was referred for consideration of surgery in the hospital setting.    Patient is now status post bilateral pansinus endoscopic sinus surgery with image guidance, inferior turbinate reductions 6/28/2024.  Intraoperatively patient had significant nasal polyposis with trapped inspissated secretions throughout.  Final pathology consistent with chronic inflammation.  IOn endoscopy 8/29/2024 he had patent to widely patent sinuses; there is persistent polypoid edema not unexpeted given the extent of his nasal polyposis. He has had a very good surgical result.    Unfortunately, Xhance was cost prohibitive.  At last visit I had recommended he trial Patanase in combination with fluticasone, but he stopped the fluticasone mostly because he had thought Patanase was my preferred medication. I encouraged him to use the Flonase again, ideally twice daily but at least daily again.  He can also continue the Patanase if he likes it.  I encouraged irrigations as needed.      We previously discussed potential evaluated allergy/immunology but for now we will defer testing and/or referral.  Follow-up June 2025.    This note was created using speech recognition transcription software. Despite proofreading, typographical errors may be present that affect the meaning of the content.  Please contact my office with any questions.

## 2025-01-07 ENCOUNTER — APPOINTMENT (OUTPATIENT)
Dept: ALLERGY | Facility: CLINIC | Age: 50
End: 2025-01-07
Payer: COMMERCIAL

## 2025-06-03 ENCOUNTER — APPOINTMENT (OUTPATIENT)
Dept: OTOLARYNGOLOGY | Facility: CLINIC | Age: 50
End: 2025-06-03
Payer: COMMERCIAL

## 2025-07-16 ENCOUNTER — APPOINTMENT (OUTPATIENT)
Dept: OPHTHALMOLOGY | Facility: CLINIC | Age: 50
End: 2025-07-16
Payer: COMMERCIAL

## 2025-07-16 DIAGNOSIS — H52.203 HYPEROPIA OF BOTH EYES WITH ASTIGMATISM AND PRESBYOPIA: ICD-10-CM

## 2025-07-16 DIAGNOSIS — H52.213 IRREGULAR ASTIGMATISM OF BOTH EYES: Primary | ICD-10-CM

## 2025-07-16 DIAGNOSIS — H18.603 KERATOCONUS OF BOTH EYES: ICD-10-CM

## 2025-07-16 DIAGNOSIS — H52.03 HYPEROPIA OF BOTH EYES WITH ASTIGMATISM AND PRESBYOPIA: ICD-10-CM

## 2025-07-16 DIAGNOSIS — H52.4 HYPEROPIA OF BOTH EYES WITH ASTIGMATISM AND PRESBYOPIA: ICD-10-CM

## 2025-07-16 PROCEDURE — 92025 CPTRIZED CORNEAL TOPOGRAPHY: CPT | Performed by: OPTOMETRIST

## 2025-07-16 PROCEDURE — 92004 COMPRE OPH EXAM NEW PT 1/>: CPT | Performed by: OPTOMETRIST

## 2025-07-16 PROCEDURE — 92015 DETERMINE REFRACTIVE STATE: CPT | Performed by: OPTOMETRIST

## 2025-07-16 ASSESSMENT — ENCOUNTER SYMPTOMS
PSYCHIATRIC NEGATIVE: 0
EYES NEGATIVE: 1
CONSTITUTIONAL NEGATIVE: 0
CARDIOVASCULAR NEGATIVE: 0
HEMATOLOGIC/LYMPHATIC NEGATIVE: 0
ALLERGIC/IMMUNOLOGIC NEGATIVE: 0
MUSCULOSKELETAL NEGATIVE: 0
RESPIRATORY NEGATIVE: 0
ENDOCRINE NEGATIVE: 0
GASTROINTESTINAL NEGATIVE: 0
NEUROLOGICAL NEGATIVE: 0

## 2025-07-16 ASSESSMENT — TONOMETRY
OS_IOP_MMHG: 14
IOP_METHOD: GOLDMANN APPLANATION
OD_IOP_MMHG: 14

## 2025-07-16 ASSESSMENT — CONF VISUAL FIELD
OS_INFERIOR_TEMPORAL_RESTRICTION: 0
OS_INFERIOR_NASAL_RESTRICTION: 0
OD_SUPERIOR_TEMPORAL_RESTRICTION: 0
OS_SUPERIOR_TEMPORAL_RESTRICTION: 0
OD_INFERIOR_TEMPORAL_RESTRICTION: 0
OS_NORMAL: 1
OD_SUPERIOR_NASAL_RESTRICTION: 0
OD_INFERIOR_NASAL_RESTRICTION: 0
OS_SUPERIOR_NASAL_RESTRICTION: 0
OD_NORMAL: 1

## 2025-07-16 ASSESSMENT — REFRACTION_MANIFEST
OD_AXIS: 090
OD_CYLINDER: -6.50
OS_ADD: +2.00
OS_AXIS: 095
OS_CYLINDER: -6.50
OS_SPHERE: +4.25
OD_ADD: +2.00
OD_SPHERE: +4.25

## 2025-07-16 ASSESSMENT — SLIT LAMP EXAM - LIDS
COMMENTS: GOOD POSITION
COMMENTS: GOOD POSITION

## 2025-07-16 ASSESSMENT — REFRACTION_WEARINGRX
OS_AXIS: 098
OD_AXIS: 085
OS_ADD: +1.75
OS_CYLINDER: -6.50
OD_ADD: +1.75
OS_SPHERE: +4.25
OD_SPHERE: +4.25
OD_CYLINDER: -6.75

## 2025-07-16 ASSESSMENT — EXTERNAL EXAM - RIGHT EYE: OD_EXAM: NORMAL

## 2025-07-16 ASSESSMENT — VISUAL ACUITY
OS_CC: 20/20-2
METHOD: SNELLEN - LINEAR
OD_CC: J1
OD_CC: 20/25
CORRECTION_TYPE: GLASSES
OS_CC: J1

## 2025-07-16 ASSESSMENT — EXTERNAL EXAM - LEFT EYE: OS_EXAM: NORMAL

## 2025-07-16 ASSESSMENT — CUP TO DISC RATIO
OD_RATIO: 0.2
OS_RATIO: 0.15

## 2025-07-16 NOTE — PROGRESS NOTES
K[facility]: inferior cone vs PMD OU.   Pentacam topographical analysis of the cornea revealed:  OD:simulated keratometric values  39.8/46.1  Kmax:49.7  thinnest corneal pachymetry value 486 micron  posterior float deviation from normal +59 micron  OS:simulated keratometric values  39.7/45.0  Kmax:49.8  thinnest corneal pachymetry value 484 micron  posterior float deviation from normal +63 micron  These values are indicative of corneal irregularity and likely contribute to reduced visual acuity.     VA corrects to 20/25 20/20 OD/OS.    A spectacle prescription was dispensed to be used as needed.     Ocular health unremarkable otherwise.    Rtc 1 year for manifest refraction (MR) DFE and katt.

## 2025-08-06 ENCOUNTER — APPOINTMENT (OUTPATIENT)
Dept: OTOLARYNGOLOGY | Facility: CLINIC | Age: 50
End: 2025-08-06
Payer: COMMERCIAL

## 2025-08-07 ENCOUNTER — OFFICE VISIT (OUTPATIENT)
Dept: OTOLARYNGOLOGY | Facility: CLINIC | Age: 50
End: 2025-08-07
Payer: COMMERCIAL

## 2025-08-07 VITALS
HEART RATE: 74 BPM | HEIGHT: 69 IN | SYSTOLIC BLOOD PRESSURE: 116 MMHG | BODY MASS INDEX: 27.52 KG/M2 | WEIGHT: 185.8 LBS | OXYGEN SATURATION: 97 % | TEMPERATURE: 98 F | RESPIRATION RATE: 16 BRPM | DIASTOLIC BLOOD PRESSURE: 82 MMHG

## 2025-08-07 DIAGNOSIS — J32.4 CHRONIC PANSINUSITIS: Primary | ICD-10-CM

## 2025-08-07 DIAGNOSIS — J31.0 CHRONIC RHINITIS: ICD-10-CM

## 2025-08-07 DIAGNOSIS — J33.9 NASAL POLYPS: ICD-10-CM

## 2025-08-07 PROCEDURE — 3008F BODY MASS INDEX DOCD: CPT | Performed by: STUDENT IN AN ORGANIZED HEALTH CARE EDUCATION/TRAINING PROGRAM

## 2025-08-07 PROCEDURE — 1036F TOBACCO NON-USER: CPT | Performed by: STUDENT IN AN ORGANIZED HEALTH CARE EDUCATION/TRAINING PROGRAM

## 2025-08-07 PROCEDURE — 99213 OFFICE O/P EST LOW 20 MIN: CPT | Performed by: STUDENT IN AN ORGANIZED HEALTH CARE EDUCATION/TRAINING PROGRAM

## 2025-08-07 PROCEDURE — 99212 OFFICE O/P EST SF 10 MIN: CPT

## 2025-08-07 PROCEDURE — 31231 NASAL ENDOSCOPY DX: CPT | Performed by: STUDENT IN AN ORGANIZED HEALTH CARE EDUCATION/TRAINING PROGRAM

## 2025-08-07 RX ORDER — OLOPATADINE HYDROCHLORIDE 665 UG/1
2 SPRAY NASAL 2 TIMES DAILY
Qty: 30.5 G | Refills: 11 | Status: SHIPPED | OUTPATIENT
Start: 2025-08-07

## 2025-08-07 RX ORDER — FLUTICASONE PROPIONATE 50 MCG
2 SPRAY, SUSPENSION (ML) NASAL 2 TIMES DAILY
Qty: 16 G | Refills: 11 | Status: SHIPPED | OUTPATIENT
Start: 2025-08-07 | End: 2026-08-07

## 2025-08-07 ASSESSMENT — ENCOUNTER SYMPTOMS
LOSS OF SENSATION IN FEET: 0
OCCASIONAL FEELINGS OF UNSTEADINESS: 0
DEPRESSION: 0

## 2025-08-07 ASSESSMENT — PAIN SCALES - GENERAL: PAINLEVEL_OUTOF10: 0-NO PAIN

## 2025-08-07 NOTE — PROGRESS NOTES
"SUBJECTIVE  Patient ID: Pepe Whatley is a 49 y.o. male who presents for Follow-up.    History 3/28/2024:  Referred by my partner for surgery; needs hospital setting for surgery due to SVT.    The patient reports prior surgery for his sinuses some 12 years ago; performed in [facility]. He notes constant nasal congestion/obstruction. Sense of smell is \"okay.\" They deny nasal drainage, facial pressure, facial pain, and epistaxis. They deny a history of environmental allergies; no formal allergy testing.  Previously tried steroid spray (Flonase) and oral steroids.    Update 11/27/2024:  He reports that he is breathing well without much drainage. Sense of smell is good. He is using Patanase twice a day but stopped Flonase; there was some confusion about whether he should continue this. He is irrigating infrequently.    Update 8/7/2025: Here today with his 12-year-old son, Keshwan.  Does \"football\" and wrestling.  Follow-up for chronic sinusitis and nasal polyposis. He reports that he continues to breathe well. Sense of smell is \"okay.\" He is using Flonase and Patanase twice a day. He is irrigating as needed.    OBJECTIVE  Physical Exam  CONSTITUTIONAL: Well appearing male who appears stated age.  PSYCHIATRIC: Alert, appropriate mood and affect.  RESPIRATORY: Normal inspiration and expiration and chest wall expansion; no use of accessory muscles to breathe.  VOICE: Clear speech without hoarseness. No stridor nor stertor.  HEAD, FACE, AND SKIN: Symmetric facial feature.  NOSE: Nasal dorsum was midline.  On anterior rhinoscopy there is some mild residual left NSD. NO drainage. Paranasal sinuses appear clear. See procedure below.  OROPHARYNX: No drainage.    Radiology  CT sinus 2/15/2024      --------------------------------------------------  Procedure: Nasal endoscopy - Diagnostic  Indication: Chronic rhinitis, CRS, nasal polyps  Informed consent verbally obtained: The risks, benefits, alternatives, and expectations " were discussed with the patient, who wished to proceed  Anesthesia: Topical lidocaine/oxymetazoline    Findings: After topical anesthetic was applied the nasal cavities were examined with a flexible endoscope. The septum was mildly deviated with S-shaped right to left deviation.  - Right: The inferior turbinate was well lateralized and reduced with minimal residual hypertrophy. The middle turbinate was resected.  The maxillary antrostomy was slightly contracted with some scarring at the natural drainage pathway; some thin drainage appreciated.  The ethmoid cavity and sphenoid sinus were widely patent.  There appears to be some scarring at the anterior skull base and the frontal sinus outflow tract was not well-visualized.  No recurrent polyposis noted.  Nasal passageways patent.  - Left: The inferior turbinate was well lateralized and reduced with mild edema/hypertrophy. The middle turbinate was resected.  The maxillary antrostomy was slightly contracted but otherwise patent.  The ethmoid cavity and sphenoid sinus were widely patent.  The frontal recess and outflow tract were widely patent.  No recurrent polyposis noted.  Nasal passageways patent.    There were no complications and the patient tolerated the procedure well.  --------------------------------------------------    ASSESSMENT/PLAN  Diagnoses and all orders for this visit:  Chronic pansinusitis  Nasal polyps  -     olopatadine (Patanase) 0.6 % spray,non-aerosol nasal spray; Administer 2 sprays into affected nostril(s) 2 times a day.  -     fluticasone (Flonase) 50 mcg/actuation nasal spray; Administer 2 sprays into each nostril 2 times a day. Shake gently. Before first use, prime pump. After use, clean tip and replace cap.  Chronic rhinitis  -     olopatadine (Patanase) 0.6 % spray,non-aerosol nasal spray; Administer 2 sprays into affected nostril(s) 2 times a day.  -     fluticasone (Flonase) 50 mcg/actuation nasal spray; Administer 2 sprays into each  nostril 2 times a day. Shake gently. Before first use, prime pump. After use, clean tip and replace cap.      49 y.o. male presenting with longstanding history of chronic sinusitis with nasal polyposis.    1.  Chronic sinusitis, nasal polyposis, nasal obstruction  The patient has longstanding history of chronic sinusitis.  He reports prior surgery some decade ago.  On nasal endoscopy the patient has fidelia nasal polyps obstructing the middle meatus bilaterally.  Imaging demonstrates varying levels of pansinusitis.  The patient was referred for consideration of surgery in the hospital setting.    Patient is now status post bilateral pansinus endoscopic sinus surgery and inferior turbinate reductions 6/28/2024.  Intraoperatively patient had significant nasal polyposis with trapped inspissated secretions throughout.  Final pathology consistent with chronic inflammation.  On endoscopy 8/29/2024 he had patent to widely patent sinuses; there is persistent polypoid edema not unexpected given the extent of his nasal polyposis. He has had a very good surgical result.    Unfortunately, Xhance was cost prohibitive.  After our last visit the patient is continue to use twice daily fluticasone and Patanase sprays.  He notes improvement in nasal breathing but unfortunately sense of smell continues to be only okay.  However, on anterior rhinoscopy and repeat endoscopy I appreciate no recurrent nasal polyposis.  There does appear to be some scarring along the right skull base but as he is otherwise asymptomatic for now we can observe.    We will continue his current medications with irrigations as needed.  Follow-up in 1 year; only need to repeat scoping with new concerns.    This note was created using speech recognition transcription software. Despite proofreading, typographical errors may be present that affect the meaning of the content. Please contact my office with any questions.

## 2026-07-21 ENCOUNTER — APPOINTMENT (OUTPATIENT)
Dept: OPHTHALMOLOGY | Facility: CLINIC | Age: 51
End: 2026-07-21
Payer: COMMERCIAL

## (undated) DEVICE — TRACKER, INSTRUMENT

## (undated) DEVICE — BLADE, INFERIOR TURBINATE, M4 ROTATABLE, 2MM STR

## (undated) DEVICE — NEEDLE, HYPODERMIC, 25 G X 2 IN

## (undated) DEVICE — DRAPE, FLUID WARMING, CORETEMP, 56X56

## (undated) DEVICE — SHEATH, ENDOSCRUB ZERO DEGREE, F/XOMED

## (undated) DEVICE — Device

## (undated) DEVICE — SOLUTION, ANTI FOG, W/SPONGE, ENDOMATE

## (undated) DEVICE — TRACKER, STINGRAY, NON-INVASIVE, AXIEM STEALTH NAVIGATION

## (undated) DEVICE — DRESSING, HEMOPORE, 8CM

## (undated) DEVICE — STAPLER, SKIN, MULTIFIRE, PREMIUM, WIDE, 35 W

## (undated) DEVICE — PROTECTOR, HEEL/ANKLE/ELBOW, UNIVERSAL

## (undated) DEVICE — BLADE, FUSION 4.3 X 13 ROTATABLE

## (undated) DEVICE — DRAPE, INSTRUMENT, W/POUCH, STERI DRAPE, 7 X 11 IN, DISPOSABLE, STERILE

## (undated) DEVICE — TIP, SUCTION, FRAZIER, 12 FR

## (undated) DEVICE — COAGULATOR, SUCTION, LECTROVAC, 10 FR, 6 IN

## (undated) DEVICE — CATHETER TRAY, SURESTEP, 16FR, URINE METER W/STATLOCK

## (undated) DEVICE — SLEEVE, VASO PRESS, CALF GARMENT, MEDIUM, GREEN